# Patient Record
Sex: MALE | Race: BLACK OR AFRICAN AMERICAN | Employment: UNEMPLOYED | URBAN - METROPOLITAN AREA
[De-identification: names, ages, dates, MRNs, and addresses within clinical notes are randomized per-mention and may not be internally consistent; named-entity substitution may affect disease eponyms.]

---

## 2020-07-01 ENCOUNTER — APPOINTMENT (OUTPATIENT)
Dept: PHYSICAL THERAPY | Age: 45
End: 2020-07-01

## 2020-11-03 ENCOUNTER — HOSPITAL ENCOUNTER (OUTPATIENT)
Dept: NUTRITION | Age: 45
Discharge: HOME | End: 2020-11-03
Payer: COMMERCIAL

## 2020-11-03 PROCEDURE — 97802 MEDICAL NUTRITION INDIV IN: CPT

## 2020-11-03 NOTE — PROGRESS NOTES
..  [facility]  [address] [facility] [place] [facility]   Nutrition Assessment  Medical Nutrition Therapy   Outpatient Initial Evaluation         Patient Name: Traci Avina : 1975   Treatment Diagnosis: Type 2 DM   Referral Source: Vianney Wilson DPM [place] [facility]): 11/3/2020     Gender: male Age: 54 y.o. Ht: 72 in Wt: 420  lb  kg   BMI: 57.0 BMR   Male 5 BMR Female      Past Medical History:  NICHOLE, gout, hypertension, morbid obesity, history of PE, history of diabetic foot ulcer     Pertinent Medications:     Pitavastatin 2 mg, Allopurinol 100 mg, Norvasc 10 mg, Lisinopril 40 mg, Lasix 40 mg, The patient says he is taking 20 units of insulin at breakfast and dinner (he didn't know what kind)   Biochemical Data:   20 Glu 534, Glu  No recent A1C available. The patient says he thinks it was in the  recently and that's why he was put on insulin. Assessment:   The patient is here today for weight loss and diabetes education counseling. He says that he was diagnosed with diabetes a few years ago and received education about diabetes from a nurse at that time. Currently he is dealing with a diabetic foot ulcer that is not healing properly. He did not mention if he had been hospitalized. He say he just went back to the doctor and received a prescription for antibiotics; he still needs to  his prescription. He also reported that he started on insulin recently but he can't remember what kind. He takes 20 units before breakfast and 20 units before dinner. He says he does not check his blood sugars because he does not have a meter that matches the strips that he receives from his insurance company. He says that he's been trying to get this resolved for a few months. He works as a  but he's been out of work due to his foot ulcer. He says normally he is on his feet from 3-11, 5 days per week.  Lately, he has not been exercising. He also reports that he has a gym membership but he's not using it. He says he knows what to do to lose weight but he has trouble getting motivated to do it. Food & Nutrition: The patient says he only eats two, large meals every day. He has given up all sugary drinks but he still eats fast food sometimes. He says he  knows he eats \"too much. \"    Breakfast (8-9): turkey sausage or turkey saenz (3), 3 scrambled eggs, \"a bowl\" of cinnamon applesauce, or grits with butter, or oatmeal with butter and sugar  Snack: sometimes a turkey sandwich on white bread but usually skips  Dinner (6 PM): chicken, potatoes or rice (\"too much\"), loves vegetables- brussels sprouts, broccoli    Loves sweets. \"That's how I got to the size I am.\"  Beverages: seltzer water (4 cans per day), and water   Fast food- \"less now than I was\"    Problem areas: no portion control, eating two large meals per day, large gaps of time in between meals leads to overeating and overproducing sugar in the liver, fast food consumption, not a lot of starchy veggies in the diet, no exercise        Estimate Needs   Calories: 2800  Protein: 176 Carbs: 318 Fat: 94   Kcal/day  g/day  g/day  g/day        percent: 25  45  30               Nutrition Diagnosis   Morbid obesity related to excessive energy intake as evidenced by BMI of 57  Altered nutrition related lab values related to type 2 DM as evidenced by glucose of 534 and glucose POC of 339 on 8/16/20         Nutrition Intervention &  Education: Educated patient in diabetes, weight loss diet with plate method guidelines. Discussed the pathophysiology of diabetes, what is an A1C, the importance of checking blood sugars. Encouraged the patient to eat at least 3 times per day, spacing meals no more than 4-5 hours apart. Discussed that 1/2 the plate should include non-starchy vegetables, 1/4 plate should be lean protein, and 1/4 of plate should be carbohydrate.  Provided the patient with list of macro-nutrient sources (CHO, pro, and fat) and appropriate amounts of CHO to be consumed at each meal and snack. Encouraged the patient to try using measuring cups to familiarize with appropriate serving sizes. Suggested the patient include protein source with all meals and snacks. Include more non-starchy vegetables and 2 fruit servings per day (eat a variety). Don't drink calories: avoid fruit juice, regular sodas, sweet tea, Gatorade. Eliminate/decrease fast food consumption. Taught patient how to read a food label. We discussed the importance of timing of meals. Discussed importance of 150 minutes per week physical activity. Handouts Provided: [x]  Carbohydrates  [x]  Protein  [x]  Non-starchy Vegetables  [x]  Food Label  [x]  Meal and Snack Ideas  []  Food Journals [x]  Diabetes  []  Cholesterol  []  Sodium  [x]  Gen Nutr Guidelines  []  SBGM Guidelines  [x]  Others: My Healthy Plate   Information Reviewed with: Patient   Readiness to Change Stage: [x]  Pre-contemplative    []  Contemplative  []  Preparation               []  Action                  []  Maintenance   Potential Barriers to Learning: []  Decline in memory    []  Language barrier   []  Other:  []  Emotional                  []  Limited mobility  [x]  Lack of motivation     [] Vision, hearing or cognitive impairment   Expected Compliance:  Fair. The patient was willing to listen but I am not sure he is ready to make the lifestyle changes necessary to lose weight and have better blood sugar control. He says he knows what to do, it's just a matter of doing it. Nutritional Goal - To promote lifestyle changes to result in:    [x]  Weight loss  [x]  Improved diabetic control  []  Decreased cholesterol levels  []  Decreased blood pressure  []  Weight maintenance []  Preventing any interactions associated with food allergies  []  Adequate weight gain toward goal weight  []  Other:        Patient Goals:   1. Follow plate method for meals.  Space meals 4-5 hours apart. Eat smaller breakfast (measure portions of carbs). Eat lunch. Try to eat 3 smaller meals per day at regular times. 2. Add non-starchy vegetables to lunch and dinner meals. If need more food, eat more non-starchy vegetables and lean protein. Don't go back for seconds on carbs. 3. Eat more at home- less fast food. Don't super size. 4. Try walking after meals (10-15 minutes at a time as medically feasible). Goal of 30 minutes, 5 days per week. Be consistent with exercise.    William Gilmore RD, [facility]  Follow-up: The patient said he will call for an appointment if he needs more help Time: 10:35 AM

## 2020-11-30 ENCOUNTER — TELEPHONE (OUTPATIENT)
Dept: SURGERY | Age: 45
End: 2020-11-30

## 2020-11-30 NOTE — TELEPHONE ENCOUNTER
Spoke with patient indicating he has been referred to us by David Pathak N.P. for a thyroid nodule. The patient stated he would call us back, he was in process of attending a doctor's appointment.

## 2024-02-28 ENCOUNTER — OFFICE VISIT (OUTPATIENT)
Facility: CLINIC | Age: 49
End: 2024-02-28
Payer: COMMERCIAL

## 2024-02-28 VITALS
RESPIRATION RATE: 22 BRPM | TEMPERATURE: 97.3 F | HEIGHT: 72 IN | DIASTOLIC BLOOD PRESSURE: 80 MMHG | BODY MASS INDEX: 42.66 KG/M2 | WEIGHT: 315 LBS | OXYGEN SATURATION: 97 % | HEART RATE: 93 BPM | SYSTOLIC BLOOD PRESSURE: 136 MMHG

## 2024-02-28 DIAGNOSIS — E11.69 TYPE 2 DIABETES MELLITUS WITH OTHER SPECIFIED COMPLICATION, WITHOUT LONG-TERM CURRENT USE OF INSULIN (HCC): Primary | ICD-10-CM

## 2024-02-28 DIAGNOSIS — E66.01 MORBID OBESITY WITH BMI OF 60.0-69.9, ADULT (HCC): ICD-10-CM

## 2024-02-28 DIAGNOSIS — M25.511 RIGHT SHOULDER PAIN, UNSPECIFIED CHRONICITY: ICD-10-CM

## 2024-02-28 PROCEDURE — 99204 OFFICE O/P NEW MOD 45 MIN: CPT | Performed by: FAMILY MEDICINE

## 2024-02-28 RX ORDER — DAPAGLIFLOZIN 10 MG/1
10 TABLET, FILM COATED ORAL DAILY
COMMUNITY

## 2024-02-28 RX ORDER — AMLODIPINE BESYLATE 10 MG/1
10 TABLET ORAL DAILY
COMMUNITY
Start: 2021-03-06

## 2024-02-28 RX ORDER — LANCETS 28 GAUGE
EACH MISCELLANEOUS
COMMUNITY
Start: 2024-01-15

## 2024-02-28 RX ORDER — INSULIN DETEMIR 100 [IU]/ML
30 INJECTION, SOLUTION SUBCUTANEOUS NIGHTLY
Qty: 4 EACH | Refills: 5 | Status: SHIPPED | OUTPATIENT
Start: 2024-02-28

## 2024-02-28 RX ORDER — ALLOPURINOL 100 MG/1
100 TABLET ORAL DAILY
COMMUNITY

## 2024-02-28 RX ORDER — PEN NEEDLE, DIABETIC 31 GX5/16"
NEEDLE, DISPOSABLE MISCELLANEOUS
COMMUNITY
Start: 2024-01-21

## 2024-02-28 RX ORDER — LISINOPRIL 40 MG/1
1 TABLET ORAL DAILY
COMMUNITY
Start: 2020-02-04

## 2024-02-28 RX ORDER — WARFARIN SODIUM 5 MG/1
5 TABLET ORAL
COMMUNITY

## 2024-02-28 RX ORDER — ACETAMINOPHEN 325 MG/1
650 TABLET ORAL EVERY 6 HOURS
COMMUNITY
Start: 2021-04-01

## 2024-02-28 RX ORDER — BLOOD SUGAR DIAGNOSTIC
STRIP MISCELLANEOUS
COMMUNITY
Start: 2024-01-15

## 2024-02-28 RX ORDER — MELOXICAM 15 MG/1
15 TABLET ORAL DAILY
Qty: 10 TABLET | Refills: 3 | Status: SHIPPED | OUTPATIENT
Start: 2024-02-28 | End: 2024-04-08

## 2024-02-28 RX ORDER — PHENTERMINE HYDROCHLORIDE 37.5 MG/1
37.5 CAPSULE ORAL DAILY
COMMUNITY

## 2024-02-28 RX ORDER — INSULIN LISPRO 200 [IU]/ML
10 INJECTION, SOLUTION SUBCUTANEOUS
COMMUNITY

## 2024-02-28 RX ORDER — DULAGLUTIDE 0.75 MG/.5ML
0.75 INJECTION, SOLUTION SUBCUTANEOUS
COMMUNITY
End: 2024-02-28 | Stop reason: DRUGHIGH

## 2024-02-28 RX ORDER — INSULIN DETEMIR 100 [IU]/ML
INJECTION, SOLUTION SUBCUTANEOUS NIGHTLY
COMMUNITY
End: 2024-02-28 | Stop reason: SDUPTHER

## 2024-02-28 RX ORDER — BLOOD-GLUCOSE METER
EACH MISCELLANEOUS
COMMUNITY
Start: 2024-01-18

## 2024-02-28 RX ORDER — ATORVASTATIN CALCIUM 40 MG/1
40 TABLET, FILM COATED ORAL DAILY
COMMUNITY

## 2024-02-28 SDOH — ECONOMIC STABILITY: FOOD INSECURITY: WITHIN THE PAST 12 MONTHS, THE FOOD YOU BOUGHT JUST DIDN'T LAST AND YOU DIDN'T HAVE MONEY TO GET MORE.: NEVER TRUE

## 2024-02-28 SDOH — ECONOMIC STABILITY: HOUSING INSECURITY
IN THE LAST 12 MONTHS, WAS THERE A TIME WHEN YOU DID NOT HAVE A STEADY PLACE TO SLEEP OR SLEPT IN A SHELTER (INCLUDING NOW)?: NO

## 2024-02-28 SDOH — ECONOMIC STABILITY: FOOD INSECURITY: WITHIN THE PAST 12 MONTHS, YOU WORRIED THAT YOUR FOOD WOULD RUN OUT BEFORE YOU GOT MONEY TO BUY MORE.: NEVER TRUE

## 2024-02-28 SDOH — ECONOMIC STABILITY: INCOME INSECURITY: HOW HARD IS IT FOR YOU TO PAY FOR THE VERY BASICS LIKE FOOD, HOUSING, MEDICAL CARE, AND HEATING?: NOT HARD AT ALL

## 2024-02-28 ASSESSMENT — PATIENT HEALTH QUESTIONNAIRE - PHQ9
2. FEELING DOWN, DEPRESSED OR HOPELESS: 0
SUM OF ALL RESPONSES TO PHQ QUESTIONS 1-9: 0
1. LITTLE INTEREST OR PLEASURE IN DOING THINGS: 0
SUM OF ALL RESPONSES TO PHQ QUESTIONS 1-9: 0
SUM OF ALL RESPONSES TO PHQ9 QUESTIONS 1 & 2: 0

## 2024-02-28 ASSESSMENT — ENCOUNTER SYMPTOMS
BACK PAIN: 1
ABDOMINAL PAIN: 0
CHEST TIGHTNESS: 0

## 2024-02-28 NOTE — PROGRESS NOTES
\"Have you been to the ER, urgent care clinic since your last visit?  Hospitalized since your last visit?\"    NO    “Have you seen or consulted any other health care providers outside of [facility] since your last visit?”    NO    “Have you had a colorectal cancer screening such as a colonoscopy/FIT/Cologuard?    NO    Temo MAKEDA Zhao presents today for   Chief Complaint   Patient presents with    New Patient    Establish Care       Is someone accompanying this pt? No    Is the patient using any DME equipment during OV? No    Depression Screenin/28/2024     2:02 PM   PHQ-9 Questionaire   Little interest or pleasure in doing things 0   Feeling down, depressed, or hopeless 0   PHQ-9 Total Score 0         2024     2:02 PM   PHQ Scores   PHQ2 Score 0   PHQ9 Score 0       Learning Assessment:  No question data found.    Abuse Screening:       No data to display                Fall Risk       No data to display                OPIOID RISK TOOL       No data to display                       
appearance. He is obese.   HENT:      Head: Normocephalic and atraumatic.   Cardiovascular:      Rate and Rhythm: Normal rate and regular rhythm.   Pulmonary:      Effort: Pulmonary effort is normal.      Breath sounds: Normal breath sounds.   Abdominal:      General: Abdomen is flat.      Palpations: Abdomen is soft.   Musculoskeletal:      Right shoulder: Tenderness and crepitus present. No swelling, deformity or effusion. Decreased range of motion. Normal pulse.      Left shoulder: Tenderness present. No swelling, deformity or effusion. Decreased range of motion. Normal pulse.      Right upper arm: Normal. No edema.      Left upper arm: Normal. No edema.        Arms:       Comments: Area of pain and tenderness. No swelling or erythema.   Able to abduct to 90 degrees with pain. Having difficult time trying to touch back of neck.   Skin:     General: Skin is warm and dry.   Neurological:      Mental Status: He is alert.   Psychiatric:         Judgment: Judgment normal.                  An electronic signature was used to authenticate this note.    --Catalina Beaulieu MD

## 2024-02-29 LAB
A/G RATIO: 0.9 RATIO (ref 1.1–2.6)
ALBUMIN SERPL-MCNC: 3.6 G/DL (ref 3.5–5)
ALP BLD-CCNC: 109 U/L (ref 25–115)
ALT SERPL-CCNC: 22 U/L (ref 5–40)
ANION GAP SERPL CALCULATED.3IONS-SCNC: 10 MMOL/L (ref 3–15)
AST SERPL-CCNC: 15 U/L (ref 10–37)
BILIRUB SERPL-MCNC: 0.2 MG/DL (ref 0.2–1.2)
BUN BLDV-MCNC: 17 MG/DL (ref 6–22)
CALCIUM SERPL-MCNC: 9.1 MG/DL (ref 8.4–10.5)
CHLORIDE BLD-SCNC: 104 MMOL/L (ref 98–110)
CHOLESTEROL/HDL RATIO: 4.4 (ref 0–5)
CHOLESTEROL: 166 MG/DL (ref 110–200)
CO2: 24 MMOL/L (ref 20–32)
CREAT SERPL-MCNC: 1.8 MG/DL (ref 0.5–1.2)
ESTIMATED AVERAGE GLUCOSE: 215 MG/DL (ref 91–123)
GLOBULIN: 4 G/DL (ref 2–4)
GLOMERULAR FILTRATION RATE: 44.9 ML/MIN/1.73 SQ.M.
GLUCOSE: 286 MG/DL (ref 70–99)
HBA1C MFR BLD: 9.1 % (ref 4.8–5.6)
HDLC SERPL-MCNC: 38 MG/DL
LDL CHOLESTEROL CALCULATED: 80 MG/DL (ref 50–99)
LDL/HDL RATIO: 2.1
NON-HDL CHOLESTEROL: 128 MG/DL
POTASSIUM SERPL-SCNC: 4.7 MMOL/L (ref 3.5–5.5)
SODIUM BLD-SCNC: 138 MMOL/L (ref 133–145)
T4 FREE: 1 NG/DL (ref 0.9–1.8)
TOTAL PROTEIN: 7.6 G/DL (ref 6.4–8.3)
TRIGL SERPL-MCNC: 237 MG/DL (ref 40–149)
TSH SERPL DL<=0.05 MIU/L-ACNC: 2.35 MCU/ML (ref 0.27–4.2)
VLDLC SERPL CALC-MCNC: 47 MG/DL (ref 8–30)

## 2024-03-20 ENCOUNTER — TELEPHONE (OUTPATIENT)
Facility: CLINIC | Age: 49
End: 2024-03-20

## 2024-03-20 NOTE — TELEPHONE ENCOUNTER
Staff informs of recently anticoagulation services note form [facility] stating they informed the patient that we do not monitor [facility] patients, INR was 4.10 as of 3/15/24.    Call placed to [facility] to clarify the patient's status at the clinic. Staff states the pt will receive service for 30 days from receiving the non participating notice. The pt's final appointment is scheduled for 3/25/24. Staff states due to his insurance, Medicaid based, the INR at home is not covered.     PCP notified.

## 2024-03-21 DIAGNOSIS — E11.69 TYPE 2 DIABETES MELLITUS WITH OTHER SPECIFIED COMPLICATION, WITHOUT LONG-TERM CURRENT USE OF INSULIN (HCC): Primary | ICD-10-CM

## 2024-03-21 RX ORDER — DAPAGLIFLOZIN 10 MG/1
10 TABLET, FILM COATED ORAL DAILY
Qty: 90 TABLET | Refills: 1 | Status: SHIPPED | OUTPATIENT
Start: 2024-03-21

## 2024-03-21 RX ORDER — INSULIN GLARGINE 100 [IU]/ML
INJECTION, SOLUTION SUBCUTANEOUS
Qty: 5 ADJUSTABLE DOSE PRE-FILLED PEN SYRINGE | Refills: 5 | Status: SHIPPED | OUTPATIENT
Start: 2024-03-21

## 2024-03-21 NOTE — TELEPHONE ENCOUNTER
Last Appointment:  2/28/2024  Future Appointments   Date Time Provider Department Center   5/28/2024  2:30 PM Catalina Beaulieu MD [facility]

## 2024-03-25 NOTE — TELEPHONE ENCOUNTER
Received a call from [facility] to notify the pt's INR today is 1.9. Pt is instructed to be rechecked in 2 weeks. This is the pt's last appointment today. PCP notified.

## 2024-03-26 DIAGNOSIS — M1A.9XX0 CHRONIC GOUT WITHOUT TOPHUS, UNSPECIFIED CAUSE, UNSPECIFIED SITE: ICD-10-CM

## 2024-03-26 DIAGNOSIS — I10 ESSENTIAL HYPERTENSION: ICD-10-CM

## 2024-03-26 DIAGNOSIS — E11.69 TYPE 2 DIABETES MELLITUS WITH OTHER SPECIFIED COMPLICATION, WITHOUT LONG-TERM CURRENT USE OF INSULIN (HCC): Primary | ICD-10-CM

## 2024-03-26 RX ORDER — AMLODIPINE BESYLATE 10 MG/1
10 TABLET ORAL DAILY
Qty: 90 TABLET | Refills: 1 | Status: SHIPPED | OUTPATIENT
Start: 2024-03-26

## 2024-03-26 RX ORDER — BLOOD SUGAR DIAGNOSTIC
STRIP MISCELLANEOUS
Qty: 100 EACH | Refills: 5 | Status: SHIPPED | OUTPATIENT
Start: 2024-03-26

## 2024-03-26 RX ORDER — ALLOPURINOL 100 MG/1
100 TABLET ORAL DAILY
Qty: 90 TABLET | Refills: 1 | Status: SHIPPED | OUTPATIENT
Start: 2024-03-26

## 2024-04-10 ENCOUNTER — OFFICE VISIT (OUTPATIENT)
Facility: CLINIC | Age: 49
End: 2024-04-10
Payer: COMMERCIAL

## 2024-04-10 VITALS
TEMPERATURE: 97.3 F | BODY MASS INDEX: 42.66 KG/M2 | DIASTOLIC BLOOD PRESSURE: 82 MMHG | SYSTOLIC BLOOD PRESSURE: 134 MMHG | OXYGEN SATURATION: 96 % | RESPIRATION RATE: 20 BRPM | HEART RATE: 90 BPM | WEIGHT: 315 LBS | HEIGHT: 72 IN

## 2024-04-10 DIAGNOSIS — Z79.01 CURRENT USE OF LONG TERM ANTICOAGULATION: ICD-10-CM

## 2024-04-10 DIAGNOSIS — E66.01 MORBID OBESITY WITH BMI OF 60.0-69.9, ADULT (HCC): ICD-10-CM

## 2024-04-10 DIAGNOSIS — Z51.81 ENCOUNTER FOR THERAPEUTIC DRUG LEVEL MONITORING: ICD-10-CM

## 2024-04-10 DIAGNOSIS — I26.99 PULMONARY EMBOLISM AND INFARCTION (HCC): Primary | ICD-10-CM

## 2024-04-10 LAB
POC INR: 1.7
PROTHROMBIN TIME, POC: 20.3

## 2024-04-10 PROCEDURE — 85610 PROTHROMBIN TIME: CPT | Performed by: FAMILY MEDICINE

## 2024-04-10 PROCEDURE — 99213 OFFICE O/P EST LOW 20 MIN: CPT | Performed by: FAMILY MEDICINE

## 2024-04-10 RX ORDER — GLIMEPIRIDE 4 MG/1
4 TABLET ORAL
COMMUNITY
Start: 2024-03-22

## 2024-04-10 RX ORDER — FLUOROMETHOLONE 0.1 %
1 SUSPENSION, DROPS(FINAL DOSAGE FORM)(ML) OPHTHALMIC (EYE) 2 TIMES DAILY
COMMUNITY
Start: 2024-04-09

## 2024-04-10 ASSESSMENT — PATIENT HEALTH QUESTIONNAIRE - PHQ9
SUM OF ALL RESPONSES TO PHQ QUESTIONS 1-9: 0
SUM OF ALL RESPONSES TO PHQ QUESTIONS 1-9: 0
SUM OF ALL RESPONSES TO PHQ9 QUESTIONS 1 & 2: 0
SUM OF ALL RESPONSES TO PHQ QUESTIONS 1-9: 0
1. LITTLE INTEREST OR PLEASURE IN DOING THINGS: NOT AT ALL
2. FEELING DOWN, DEPRESSED OR HOPELESS: NOT AT ALL
SUM OF ALL RESPONSES TO PHQ QUESTIONS 1-9: 0

## 2024-04-10 NOTE — PROGRESS NOTES
Temo Churchill (:  1975) is a 48 y.o. male,Established patient, here for evaluation of the following chief complaint(s):  Office Visit for Anticoagulation Management         ASSESSMENT/PLAN:  1. Pulmonary embolism and infarction (HCC)  -     AMB POC PT/INR  2. Current use of long term anticoagulation  3. Encounter for therapeutic drug level monitoring  4. Morbid obesity with BMI of 60.0-69.9, adult (HCC)      Current use of long term anticoagulation-  -INR was 1.7 today decreased from 1.9.     -Patient instructed to take 10 mg of coumadin each night except take 5mg on Tuesday and Thursday.     -RTC in 10 days to recheck INR.        Morbid obesity-   Encouraged to decrease caloric intake.  Need to exercise for 30 minutes 3 to 5 times a week.  Educated on eating a well balanced healthy diet. (Consistent of lean meats, lots of fruits, vegetables and whole grains).  Limit processed foods.     Return in about 10 days (around 2024) for PE/DVT , on coumadin ( check PT/INR).       Subjective   SUBJECTIVE/OBJECTIVE:  48-year-old male with a history of pulmonary embolus unspecified whether acute cor pulmonale present.  Patient had been followed by [facility]. However, his last appointment with [facility] was on 2024.  His INR at that time was 1.9.  Patient continued to take his warfarin 60 mg per weekly as prescribed.  He was advised to continue his current dose and repeat his INR in 2 weeks per protocol.  Patient admits to adhering to his Coumadin regimen.  He denies having any easy bruising, blood in urine, nose bleed or bleeding gums.   No CP or SOB.    Tolerating medication without side effects.   He does state that he did eat some greens.    Current regimen was 10 mg coumadin each night except  for on  and .   His INR today was checked.  His current INR goal is to be between 2.0-3.0          Date:                     INR  (4/10/24)-------------

## 2024-04-10 NOTE — PROGRESS NOTES
\"Have you been to the ER, urgent care clinic since your last visit?  Hospitalized since your last visit?\"    NO    “Have you seen or consulted any other health care providers outside of [facility] since your last visit?”    NO        “Have you had a colorectal cancer screening such as a colonoscopy/FIT/Cologuard?    NO    No colonoscopy on file  No cologuard on file  No FIT/FOBT on file   No flexible sigmoidoscopy on file     [place] presents today for   Chief Complaint   Patient presents with    Office Visit for Anticoagulation Management       Is someone accompanying this pt? No     Is the patient using any DME equipment during OV? No    Depression Screenin/10/2024    11:47 AM 2024     2:02 PM   PHQ-9 Questionaire   Little interest or pleasure in doing things 0 0   Feeling down, depressed, or hopeless 0 0   PHQ-9 Total Score 0 0         4/10/2024    11:47 AM 2024     2:02 PM   PHQ Scores   PHQ2 Score 0 0   PHQ9 Score 0 0       Learning Assessment:  No question data found.    Abuse Screening:       No data to display                Fall Risk       No data to display                OPIOID RISK TOOL       No data to display

## 2024-04-11 NOTE — TELEPHONE ENCOUNTER
Patient no showed for his appointment last week, however patient was seen today in follow-up.  His INR was 1.7 which was decreased from his previous 1.9.  Patient was told to take warfarin 10 mg every night of the week except for on Tuesdays and Thursdays he is to take 5 mg of warfarin.  He will return in 10 days for his PT and INR to be rechecked.

## 2024-04-12 ASSESSMENT — ENCOUNTER SYMPTOMS
SHORTNESS OF BREATH: 0
ROS SKIN COMMENTS: NO BRUISING

## 2024-04-30 ENCOUNTER — OFFICE VISIT (OUTPATIENT)
Facility: CLINIC | Age: 49
End: 2024-04-30
Payer: COMMERCIAL

## 2024-04-30 VITALS
OXYGEN SATURATION: 97 % | WEIGHT: 315 LBS | DIASTOLIC BLOOD PRESSURE: 57 MMHG | SYSTOLIC BLOOD PRESSURE: 110 MMHG | TEMPERATURE: 97.5 F | HEIGHT: 72 IN | BODY MASS INDEX: 42.66 KG/M2 | RESPIRATION RATE: 11 BRPM | HEART RATE: 96 BPM

## 2024-04-30 DIAGNOSIS — R20.2 NUMBNESS AND TINGLING IN BOTH HANDS: ICD-10-CM

## 2024-04-30 DIAGNOSIS — E11.69 TYPE 2 DIABETES MELLITUS WITH OTHER SPECIFIED COMPLICATION, WITHOUT LONG-TERM CURRENT USE OF INSULIN (HCC): ICD-10-CM

## 2024-04-30 DIAGNOSIS — I10 ESSENTIAL HYPERTENSION: ICD-10-CM

## 2024-04-30 DIAGNOSIS — R20.0 NUMBNESS AND TINGLING IN BOTH HANDS: ICD-10-CM

## 2024-04-30 DIAGNOSIS — E66.01 MORBID OBESITY WITH BMI OF 60.0-69.9, ADULT (HCC): ICD-10-CM

## 2024-04-30 DIAGNOSIS — I26.99 PULMONARY EMBOLISM AND INFARCTION (HCC): ICD-10-CM

## 2024-04-30 DIAGNOSIS — Z79.01 CURRENT USE OF LONG TERM ANTICOAGULATION: Primary | ICD-10-CM

## 2024-04-30 DIAGNOSIS — Z51.81 ENCOUNTER FOR THERAPEUTIC DRUG LEVEL MONITORING: ICD-10-CM

## 2024-04-30 LAB
POC INR: 2.4
PROTHROMBIN TIME, POC: 28.5

## 2024-04-30 PROCEDURE — 85610 PROTHROMBIN TIME: CPT | Performed by: FAMILY MEDICINE

## 2024-04-30 PROCEDURE — 3046F HEMOGLOBIN A1C LEVEL >9.0%: CPT | Performed by: FAMILY MEDICINE

## 2024-04-30 PROCEDURE — 3078F DIAST BP <80 MM HG: CPT | Performed by: FAMILY MEDICINE

## 2024-04-30 PROCEDURE — 99213 OFFICE O/P EST LOW 20 MIN: CPT | Performed by: FAMILY MEDICINE

## 2024-04-30 PROCEDURE — 3074F SYST BP LT 130 MM HG: CPT | Performed by: FAMILY MEDICINE

## 2024-04-30 RX ORDER — AMLODIPINE BESYLATE 10 MG/1
10 TABLET ORAL DAILY
Qty: 90 TABLET | Refills: 1 | Status: SHIPPED | OUTPATIENT
Start: 2024-04-30

## 2024-04-30 ASSESSMENT — PATIENT HEALTH QUESTIONNAIRE - PHQ9
SUM OF ALL RESPONSES TO PHQ QUESTIONS 1-9: 0
SUM OF ALL RESPONSES TO PHQ QUESTIONS 1-9: 0
1. LITTLE INTEREST OR PLEASURE IN DOING THINGS: NOT AT ALL
SUM OF ALL RESPONSES TO PHQ9 QUESTIONS 1 & 2: 0
SUM OF ALL RESPONSES TO PHQ QUESTIONS 1-9: 0
2. FEELING DOWN, DEPRESSED OR HOPELESS: NOT AT ALL
SUM OF ALL RESPONSES TO PHQ QUESTIONS 1-9: 0

## 2024-04-30 NOTE — PROGRESS NOTES
\"Have you been to the ER, urgent care clinic since your last visit?  Hospitalized since your last visit?\"    NO    “Have you seen or consulted any other health care providers outside of [facility] since your last visit?”    NO        “Have you had a colorectal cancer screening such as a colonoscopy/FIT/Cologuard?    NO    No colonoscopy on file  No cologuard on file  No FIT/FOBT on file   No flexible sigmoidoscopy on file         Click Here for Release of Records Request

## 2024-04-30 NOTE — PROGRESS NOTES
Temo Churchill (:  1975) is a 48 y.o. male,Established patient, here for evaluation of the following chief complaint(s):  Follow-up      Assessment & Plan   1. Current use of long term anticoagulation  2. Essential hypertension  -     amLODIPine (NORVASC) 10 MG tablet; Take 1 tablet by mouth daily, Disp-90 tablet, R-1Normal  3. Encounter for therapeutic drug level monitoring  -     AMB POC PT/INR  4. Pulmonary embolism and infarction (HCC)  5. Morbid obesity with BMI of 60.0-69.9, adult (HCC)  6. Type 2 diabetes mellitus with other specified complication, without long-term current use of insulin (HCC)  7. Numbness and tingling in both hands      Current use of long term anticoagulation-  -INR was 2.4 today increased from 1.9.     -Patient instructed to continue to take 10 mg of coumadin each night except take 5mg on Tuesday and Thursday.     -RTC in 2 weeks to recheck INR.         Numbness/Tingling in both fingers.---  Offered patient treatment with either gabapentin or Lyrica.  However patient declined.      Morbid obesity-   Encouraged to decrease caloric intake.  Need to exercise for 30 minutes 3 to 5 times a week.  Educated on eating a well balanced healthy diet. (Consistent of lean meats, lots of fruits, vegetables and whole grains).  Limit processed foods.       Return in about 2 years (around 2026) for OV extended, PE, morbid obesity----  check PT/INR.       Subjective    Patient presents for follow up on his PT/INR .    He is taking coumadin 10 each day except on  taking 5 mg  day. He denies having any easy bruising, blood in urine, nose bleed or bleeding gums.   No CP or SOB.    Tolerating medication without side effects.   He does state that he did eat some greens.     Current regimen was 10 mg coumadin each night except  for on  and .   His INR today was checked.  His current INR goal is to be between 2.0-3.0            Date:

## 2024-05-01 ASSESSMENT — ENCOUNTER SYMPTOMS: SHORTNESS OF BREATH: 0

## 2024-05-13 ENCOUNTER — TELEPHONE (OUTPATIENT)
Facility: CLINIC | Age: 49
End: 2024-05-13

## 2024-05-13 DIAGNOSIS — E11.69 TYPE 2 DIABETES MELLITUS WITH OTHER SPECIFIED COMPLICATION, WITHOUT LONG-TERM CURRENT USE OF INSULIN (HCC): Primary | ICD-10-CM

## 2024-05-13 LAB
ESTIMATED AVERAGE GLUCOSE: 211
HBA1C MFR BLD: 9 %

## 2024-05-13 RX ORDER — GLIMEPIRIDE 4 MG/1
4 TABLET ORAL 2 TIMES DAILY
Qty: 180 TABLET | Refills: 1 | Status: SHIPPED | OUTPATIENT
Start: 2024-05-13

## 2024-05-13 NOTE — TELEPHONE ENCOUNTER
Patient is calling in stating he was just released from the hospital and had an INR done yesterday while at the hospital.  Wants to know if he still needs to come in tomorrow?  Can be reached at [phone]

## 2024-05-14 NOTE — TELEPHONE ENCOUNTER
Spoke with patient.  His INR was 2.06 which is within the therapeutic range.  Patient was informed that he can schedule his appointment for 2 weeks for repeat PT INR.  He is to continue taking warfarin 5 mg 2 tablets each night except on Tuesdays and Thursday nights he takes 1 tablet of warfarin 5 mg.    If INR is appropriate at the next 2-week office visit.  Will then check patient's PT/INR once monthly.    Orders Placed This Encounter    glimepiride (AMARYL) 4 MG tablet     Sig: Take 1 tablet by mouth 2 times daily     Dispense:  180 tablet     Refill:  1     The above medication was sent to patient's pharmacy per patient's request.

## 2024-05-28 ENCOUNTER — OFFICE VISIT (OUTPATIENT)
Facility: CLINIC | Age: 49
End: 2024-05-28
Payer: COMMERCIAL

## 2024-05-28 VITALS
OXYGEN SATURATION: 99 % | RESPIRATION RATE: 14 BRPM | DIASTOLIC BLOOD PRESSURE: 80 MMHG | WEIGHT: 315 LBS | BODY MASS INDEX: 42.66 KG/M2 | HEIGHT: 72 IN | HEART RATE: 90 BPM | SYSTOLIC BLOOD PRESSURE: 144 MMHG | TEMPERATURE: 97 F

## 2024-05-28 DIAGNOSIS — I10 ESSENTIAL HYPERTENSION: ICD-10-CM

## 2024-05-28 DIAGNOSIS — E11.69 TYPE 2 DIABETES MELLITUS WITH OTHER SPECIFIED COMPLICATION, WITHOUT LONG-TERM CURRENT USE OF INSULIN (HCC): ICD-10-CM

## 2024-05-28 DIAGNOSIS — I26.99 PULMONARY EMBOLISM AND INFARCTION (HCC): ICD-10-CM

## 2024-05-28 DIAGNOSIS — Z79.01 CURRENT USE OF LONG TERM ANTICOAGULATION: Primary | ICD-10-CM

## 2024-05-28 DIAGNOSIS — R20.0 NUMBNESS AND TINGLING IN BOTH HANDS: ICD-10-CM

## 2024-05-28 DIAGNOSIS — Z51.81 ENCOUNTER FOR THERAPEUTIC DRUG LEVEL MONITORING: ICD-10-CM

## 2024-05-28 DIAGNOSIS — R20.2 NUMBNESS AND TINGLING IN BOTH HANDS: ICD-10-CM

## 2024-05-28 DIAGNOSIS — E78.5 HYPERLIPIDEMIA, UNSPECIFIED HYPERLIPIDEMIA TYPE: ICD-10-CM

## 2024-05-28 DIAGNOSIS — E66.01 MORBID OBESITY WITH BMI OF 60.0-69.9, ADULT (HCC): ICD-10-CM

## 2024-05-28 LAB
POC INR: 1.3
PROTHROMBIN TIME, POC: 15

## 2024-05-28 PROCEDURE — 3077F SYST BP >= 140 MM HG: CPT | Performed by: FAMILY MEDICINE

## 2024-05-28 PROCEDURE — 99214 OFFICE O/P EST MOD 30 MIN: CPT | Performed by: FAMILY MEDICINE

## 2024-05-28 PROCEDURE — 3079F DIAST BP 80-89 MM HG: CPT | Performed by: FAMILY MEDICINE

## 2024-05-28 PROCEDURE — 85610 PROTHROMBIN TIME: CPT | Performed by: FAMILY MEDICINE

## 2024-05-28 PROCEDURE — 3046F HEMOGLOBIN A1C LEVEL >9.0%: CPT | Performed by: FAMILY MEDICINE

## 2024-05-28 ASSESSMENT — PATIENT HEALTH QUESTIONNAIRE - PHQ9
SUM OF ALL RESPONSES TO PHQ QUESTIONS 1-9: 0
SUM OF ALL RESPONSES TO PHQ9 QUESTIONS 1 & 2: 0
SUM OF ALL RESPONSES TO PHQ QUESTIONS 1-9: 0
2. FEELING DOWN, DEPRESSED OR HOPELESS: NOT AT ALL
SUM OF ALL RESPONSES TO PHQ QUESTIONS 1-9: 0
1. LITTLE INTEREST OR PLEASURE IN DOING THINGS: NOT AT ALL
SUM OF ALL RESPONSES TO PHQ QUESTIONS 1-9: 0

## 2024-05-28 ASSESSMENT — ENCOUNTER SYMPTOMS: SHORTNESS OF BREATH: 0

## 2024-05-28 NOTE — PROGRESS NOTES
\"Have you been to the ER, urgent care clinic since your last visit?  Hospitalized since your last visit?\"    YES - When: approximately 2  weeks ago.  Where and Why: [facility].    “Have you seen or consulted any other health care providers outside of [facility] since your last visit?”    NO        “Have you had a colorectal cancer screening such as a colonoscopy/FIT/Cologuard?    NO    No colonoscopy on file  No cologuard on file  No FIT/FOBT on file   No flexible sigmoidoscopy on file         Click Here for Release of Records Request   
USE 1 TO CHECK GLUCOSE ONCE DAILY    lisinopril (PRINIVIL;ZESTRIL) 40 MG tablet Take 1 tablet by mouth daily    phentermine 37.5 MG capsule Take 1 capsule by mouth daily.    warfarin (COUMADIN) 5 MG tablet Take 1 tablet by mouth    atorvastatin (LIPITOR) 40 MG tablet Take 1 tablet by mouth daily    insulin lispro (HUMALOG KWIKPEN) 200 UNIT/ML SOPN pen Inject 10 Units into the skin 3 times daily (with meals)    meloxicam (MOBIC) 15 MG tablet Take 1 tablet by mouth daily     No current facility-administered medications for this visit.         Allergies and Intolerances:   Allergies   Allergen Reactions    Povidone-Iodine Other (See Comments)     Patient states he is allergic to iodine but not iodinated contrast. Has had contrast multiple times in the past without reaction.    Shellfish Allergy Itching and Swelling       Family History:   No family history on file.    Social History:   He  reports that he has never smoked. He has never been exposed to tobacco smoke. He has never used smokeless tobacco.  He  reports no history of alcohol use.    Review of Systems   Constitutional:  Negative for chills and fever.   HENT:  Negative for nosebleeds.    Respiratory:  Negative for shortness of breath.    Cardiovascular:  Negative for chest pain and leg swelling.   Genitourinary:  Negative for hematuria.   Neurological:  Negative for weakness and headaches.   Hematological:  Does not bruise/bleed easily.   Psychiatric/Behavioral:  Negative for confusion.                 Objective  BP (!) 144/80 (Site: Right Upper Arm, Position: Sitting, Cuff Size: Thigh)   Pulse 90   Temp 97 °F (36.1 °C) (Temporal)   Resp 14   Ht 1.829 m (6')   Wt (!) 208.9 kg (460 lb 9.6 oz)   SpO2 99%   BMI 62.47 kg/m²     Physical Exam  Vitals and nursing note reviewed.   Constitutional:       Appearance: Normal appearance.   HENT:      Head: Normocephalic and atraumatic.      Mouth/Throat:      Mouth: Mucous membranes are moist.   Eyes:

## 2024-05-29 ENCOUNTER — TELEPHONE (OUTPATIENT)
Facility: CLINIC | Age: 49
End: 2024-05-29

## 2024-05-29 DIAGNOSIS — E11.69 TYPE 2 DIABETES MELLITUS WITH OTHER SPECIFIED COMPLICATION, WITHOUT LONG-TERM CURRENT USE OF INSULIN (HCC): Primary | ICD-10-CM

## 2024-05-29 NOTE — TELEPHONE ENCOUNTER
Pt said that he was instructed to call  [facility] that  can send in Ozempic.     Pt received text today from pharmacy stating that Trulicity is on back order there.

## 2024-05-30 ENCOUNTER — TELEPHONE (OUTPATIENT)
Facility: CLINIC | Age: 49
End: 2024-05-30

## 2024-05-31 NOTE — TELEPHONE ENCOUNTER
Orders Placed This Encounter    Semaglutide, 1 MG/DOSE, (OZEMPIC) 4 MG/3ML SOPN sc injection     Sig: Inject 1 mg into the skin every 7 days     Dispense:  3 mL     Refill:  3

## 2024-07-18 ENCOUNTER — TELEPHONE (OUTPATIENT)
Facility: CLINIC | Age: 49
End: 2024-07-18

## 2024-07-18 NOTE — TELEPHONE ENCOUNTER
Katina with [facility] is calling asking  wants them to be checking the patients INR since his recent dicharge from the hospital or is that something  would want to do in office.    Discharge ppwrk stated for the patient to check his INR 7/17/24.     Please reach out.

## 2024-07-19 NOTE — TELEPHONE ENCOUNTER
Attempted to reach [facility]. Left voicemail to return my call.

## 2024-07-19 NOTE — TELEPHONE ENCOUNTER
Inform Katina with [facility] that they can check the patient's PT/INR.  Call the results to office for MD to review and make any necessary adjustment in his warfarin.    Thanks.

## 2024-07-29 ENCOUNTER — TELEPHONE (OUTPATIENT)
Facility: CLINIC | Age: 49
End: 2024-07-29

## 2024-07-29 NOTE — TELEPHONE ENCOUNTER
Nuzhat called on behalf of the pt stating that she was calling to give the PT INR for the pt from 7/25/24. PT INR was 1.42

## 2024-08-01 NOTE — TELEPHONE ENCOUNTER
I have attempted to contact [facility] by phone regarding mutual patient to relay Dr. Beaulieu's Message   Please contact Nuzhat (home health nurse) and inform her that patient needs to take 10 mg of Coumadin  today ( Thursday 8/1/24  and on Friday 8/2/24) then take 5 mg on Saturday and Sunday.   Recheck PT/INR on Monday (8/5/2024).  Call MD with reading to receive next dosing instructions for his coumadin.  Thanks.     left message to return my call on answering machine.

## 2024-08-01 NOTE — TELEPHONE ENCOUNTER
Please contact Nuzhat (home health nurse) and inform her that patient needs to take 10 mg of Coumadin  today ( Thursday 8/1/24  and on Friday 8/2/24) then take 5 mg on Saturday and Sunday.   Recheck PT/INR on Monday (8/5/2024).  Call MD with reading to receive next dosing instructions for his coumadin.  Thanks.

## 2024-08-04 LAB
ESTIMATED AVERAGE GLUCOSE: 206
HBA1C MFR BLD: 8.8 %

## 2024-08-07 ENCOUNTER — TELEPHONE (OUTPATIENT)
Facility: CLINIC | Age: 49
End: 2024-08-07

## 2024-08-07 NOTE — TELEPHONE ENCOUNTER
Paula with [facility] is calling to inform you that she needs INR orders faxed to their office at [phone] and to also inform you that the patient may not be able to attend his appointment on 8/12/24 due to transportation issues

## 2024-08-09 NOTE — TELEPHONE ENCOUNTER
[facility],   Inform her that orders were called in by you regarding INR orders.     Still awaiting INR results.   Thanks.

## 2024-08-09 NOTE — TELEPHONE ENCOUNTER
Spoke with Nuzhat today following up message I left on [employer] 8 days ago. She stated \" Mr. Churchill isn't her main patient. Katina is, I advice the message Dr. Beaulieu left again to recheck Patient's PT/INR, being that the message was received so late. The dates are changed around to have patient take Coumadin 10 mg, (8/9/2024 & 8/10/2024) then on 8/11/2024 *& 8/12/2024 take 5 mg of Coumadin. Them recheck PT/INR on Tuesday 08/13/2024.

## 2024-08-12 DIAGNOSIS — I10 ESSENTIAL HYPERTENSION: ICD-10-CM

## 2024-08-12 RX ORDER — AMLODIPINE BESYLATE 10 MG/1
10 TABLET ORAL DAILY
Qty: 90 TABLET | Refills: 0 | Status: SHIPPED | OUTPATIENT
Start: 2024-08-12

## 2024-08-12 RX ORDER — LISINOPRIL 10 MG/1
10 TABLET ORAL DAILY
Qty: 90 TABLET | Refills: 1 | Status: SHIPPED | OUTPATIENT
Start: 2024-08-12

## 2024-08-13 ENCOUNTER — TELEPHONE (OUTPATIENT)
Facility: CLINIC | Age: 49
End: 2024-08-13

## 2024-08-13 NOTE — TELEPHONE ENCOUNTER
Katina with [facility] is calling to let you know she was unable to get the lab today for the patient's INR.  States she will be going back tomorrow with one of the fingers stick machines.

## 2024-08-14 ENCOUNTER — TELEPHONE (OUTPATIENT)
Facility: CLINIC | Age: 49
End: 2024-08-14

## 2024-08-14 NOTE — TELEPHONE ENCOUNTER
Call transferred from  regarding patient Temo Churchill from [facility], with 2 patient identifiers.  Paz relayed patient's INR today was 2.6 on 10mg Coumadin nightly.  In addition, she said patient told her he was instructed for Monday and Tuesday to take on 5 mg Coumadin only.    Patient has a h/o PE and has canx his last 3 appt with PCP-Dr. Beaulieu.  Paz states he is having transportation issues and is unable to make doctor's appts.  He has osteomyelitis of the foot and is non-weight bearing.  She will like a return call to [phone] regarding his INR results and Coumadin dosage.

## 2024-08-16 NOTE — TELEPHONE ENCOUNTER
Patient Home health nurse called 8/14/24. She stated patient INR was 2.6 He is taking 10 mg nightly and was told on Monday and Tuesday to only take 5 mg. I believe that order was the verbal you gave 8/9/24.

## 2024-08-19 ENCOUNTER — TELEPHONE (OUTPATIENT)
Facility: CLINIC | Age: 49
End: 2024-08-19

## 2024-08-19 NOTE — TELEPHONE ENCOUNTER
Received a call from [facility]. Staff Nurse states the pt is on Coumadin and inquires if the pt needs to be monitored for INR.    Staff states the last time PT/INR was checked was 08/09/24 and the order was to repeat INR on 08/13/24. IV Team nurse will draw for the INR today. Staff is requesting an verbal order to Draw PT/INR once weekly. Last known dose was Coumadin 10mg on 08/9 and 8/10, then 5mg, 08/11 and 08/12  and recheck on 08/13.     Staff Nurse states the pt is scheduled to complete his treatment soon and will be forward on to the Skilled Home Health Team for wound care.     PCP/MA notified.

## 2024-08-20 NOTE — TELEPHONE ENCOUNTER
Please contact the infusion team at [facility].      [facility] ([facility]) [phone]      option 2, then 1    Please give the verbal order that they can check patient's PT and INR once weekly.  Please call INR results to PCP.   INR goal is to be between 2 and 3.    Please obtain how patient has been taking his Coumadin since August 13, 2024.    Thanks.

## 2024-08-20 NOTE — TELEPHONE ENCOUNTER
Called Octavia from [facility] ( [employer]) 10mg right now daily is the amount of Coumadin is taking.  Current INR is 1.5 PT is 15.9 Yesterday results were 1.50.  8/6/24 results were 1.31. Octavia stated she wants to keep doing pt INR checks on Mondays.

## 2024-08-22 DIAGNOSIS — M1A.9XX0 CHRONIC GOUT WITHOUT TOPHUS, UNSPECIFIED CAUSE, UNSPECIFIED SITE: ICD-10-CM

## 2024-08-22 DIAGNOSIS — E11.69 TYPE 2 DIABETES MELLITUS WITH OTHER SPECIFIED COMPLICATION, WITHOUT LONG-TERM CURRENT USE OF INSULIN (HCC): ICD-10-CM

## 2024-08-22 NOTE — TELEPHONE ENCOUNTER
Patient is requesting refill on      allopurinol (ZYLOPRIM) 100 MG tablet [[id number]]    Order Details  Dose: 100 mg Route: Oral Frequency: DAILY   Dispense Quantity: 90 tablet Refills: 1          Sig: Take 1 tablet by mouth daily   insulin glargine (LANTUS SOLOSTAR) 100 UNIT/ML injection pen [[id number]]    Order Details  Dose, Route, Frequency: As Directed   Dispense Quantity: 5 Adjustable Dose Pre-filled Pen Syringe Refills: 5          Sig: Inject 30 units subcutaneously twice daily     Cholecalciferol (VITAMIN D) 10 MCG (400 UNIT) CAPS Capsule [[id number]]    Order Details  Dose: 400 Units Route: Oral Frequency: --   Dispense Quantity: -- Refills: --          Sig: Take 1 capsule by mouth   dulaglutide (TRULICITY) 1.5 MG/0.5ML SC injection [[id number]]  DISCONTINUED    Order Details  Dose: 1.5 mg Route: SubCUTAneous Frequency: WEEKLY   Dispense Quantity: 4 Adjustable Dose Pre-filled Pen Syringe Refills: 5          Sig: Inject 0.5 mLs into the skin once a week    Patient is also needing refills on his Hydralazine 25 mg daily,  Multi Vitamin daily  Prilosec 20 mg daily   These medications are not on patients medication list    No future appointments.

## 2024-08-26 ENCOUNTER — TELEPHONE (OUTPATIENT)
Facility: CLINIC | Age: 49
End: 2024-08-26

## 2024-08-26 NOTE — TELEPHONE ENCOUNTER
Emily with [facility] is calling to see if it is OK to check the patient's INR tomorrow  since her has a podiatry appt today that would interfere.    Emily [phone]

## 2024-08-27 DIAGNOSIS — E11.69 TYPE 2 DIABETES MELLITUS WITH OTHER SPECIFIED COMPLICATION, WITHOUT LONG-TERM CURRENT USE OF INSULIN (HCC): ICD-10-CM

## 2024-08-27 DIAGNOSIS — I10 ESSENTIAL HYPERTENSION: Primary | ICD-10-CM

## 2024-08-27 RX ORDER — OXYCODONE AND ACETAMINOPHEN 5; 325 MG/1; MG/1
1 TABLET ORAL EVERY 6 HOURS PRN
Qty: 12 TABLET | Refills: 0 | OUTPATIENT
Start: 2024-08-27 | End: 2024-08-30

## 2024-08-27 NOTE — TELEPHONE ENCOUNTER
Patient is requesting refill on    Hydralizine 25 mg tablet 1 tab daily  Muli-vitamin once daily    insulin glargine (LANTUS SOLOSTAR) 100 UNIT/ML injection pen [[id number]]    Order Details  Dose, Route, Frequency: As Directed   Dispense Quantity: 5 Adjustable Dose Pre-filled Pen Syringe Refills: 5          Sig: Inject 30 units subcutaneously twice daily       No future appointments.

## 2024-08-27 NOTE — TELEPHONE ENCOUNTER
Contact Home Health and verify that patient is taking coumadin 10 mg a night.           If so, then   Patient needs to take 12 mg on Tuesday (today) and Friday  and 10 mg all other days. With recheck of INR on Monday.    If not taking coumadin 10 mg a night.  Please get his regimen of how his coumadin is being dosed so that I can make the appropriate adjustments.

## 2024-08-28 RX ORDER — MULTIVIT-MIN/IRON FUM/FOLIC AC 7.5 MG-4
1 TABLET ORAL DAILY
Qty: 90 TABLET | Refills: 3 | Status: SHIPPED | OUTPATIENT
Start: 2024-08-28

## 2024-08-28 RX ORDER — INSULIN GLARGINE 100 [IU]/ML
INJECTION, SOLUTION SUBCUTANEOUS
Qty: 5 ADJUSTABLE DOSE PRE-FILLED PEN SYRINGE | Refills: 5 | Status: SHIPPED | OUTPATIENT
Start: 2024-08-28

## 2024-08-28 RX ORDER — HYDRALAZINE HYDROCHLORIDE 25 MG/1
TABLET, FILM COATED ORAL
Qty: 90 TABLET | Refills: 1 | Status: SHIPPED | OUTPATIENT
Start: 2024-08-28

## 2024-08-28 NOTE — TELEPHONE ENCOUNTER
Orders Placed This Encounter    insulin glargine (LANTUS SOLOSTAR) 100 UNIT/ML injection pen     Sig: Inject 30 units subcutaneously twice daily     Dispense:  5 Adjustable Dose Pre-filled Pen Syringe     Refill:  5    hydrALAZINE (APRESOLINE) 25 MG tablet     Si tablet by mouth once daily     Dispense:  90 tablet     Refill:  1    Multiple Vitamins-Minerals (MULTIVITAMIN WITH MINERALS) tablet     Sig: Take 1 tablet by mouth daily     Dispense:  90 tablet     Refill:  3

## 2024-08-28 NOTE — TELEPHONE ENCOUNTER
Home health faxed something yesterday concerning Mr. Churchill INR. PCP faxed information back responding to home health nurse.

## 2024-08-28 NOTE — TELEPHONE ENCOUNTER
Patient INR checks are currently done every Monday.  Verbal order was given earlier in a separate encounter.

## 2024-08-29 ENCOUNTER — TELEPHONE (OUTPATIENT)
Facility: CLINIC | Age: 49
End: 2024-08-29

## 2024-08-29 NOTE — TELEPHONE ENCOUNTER
Cash with [facility] is calling to let you know the patient is due for his INR on Mon 9/2, due to the holiday it will be moved to Tue 9/3 unless you want it done on 9/2.

## 2024-08-30 NOTE — TELEPHONE ENCOUNTER
Message noted.     Please inform Cash with [facility] that it is o.k to  check patient's INR on 9/3/24 since 9/2/24 is a Holiday.   Thanks.

## 2024-09-03 DIAGNOSIS — Z76.0 MEDICATION REFILL: Primary | ICD-10-CM

## 2024-09-03 NOTE — TELEPHONE ENCOUNTER
Elizabeth from [facility] called on behalf of the pt to report INR 2.2. Elizabeth would like a call back to know when would be the next recheck on behalf of the pt. Please advise

## 2024-09-03 NOTE — TELEPHONE ENCOUNTER
Orders Placed This Encounter    Cholecalciferol (VITAMIN D) 10 MCG (400 UNIT) CAPS Capsule     Sig: Take 1 capsule by mouth daily     Dispense:  90 capsule     Refill:  3

## 2024-09-03 NOTE — TELEPHONE ENCOUNTER
Patient is requesting refill on     Sodium Bicarb 650 mg    Cholecalciferol (VITAMIN D) 10 MCG (400 UNIT) CAPS Capsule [[id number]]    Order Details  Dose: 400 Units Route: Oral Frequency: --   Dispense Quantity: -- Refills: --          Sig: Take 1 capsule by mouth     No future appointments.

## 2024-09-09 ENCOUNTER — TELEPHONE (OUTPATIENT)
Facility: CLINIC | Age: 49
End: 2024-09-09

## 2024-09-09 DIAGNOSIS — R26.81 GAIT INSTABILITY: ICD-10-CM

## 2024-09-09 DIAGNOSIS — E66.01 MORBID OBESITY WITH BMI OF 60.0-69.9, ADULT (HCC): Primary | ICD-10-CM

## 2024-09-09 DIAGNOSIS — M86.171 ACUTE OSTEOMYELITIS OF RIGHT CALCANEUS (HCC): ICD-10-CM

## 2024-09-13 ENCOUNTER — TELEPHONE (OUTPATIENT)
Facility: CLINIC | Age: 49
End: 2024-09-13

## 2024-09-13 DIAGNOSIS — M86.171 ACUTE OSTEOMYELITIS OF RIGHT CALCANEUS (HCC): Primary | ICD-10-CM

## 2024-09-23 ENCOUNTER — TELEPHONE (OUTPATIENT)
Facility: CLINIC | Age: 49
End: 2024-09-23

## 2024-09-23 NOTE — TELEPHONE ENCOUNTER
Nurse from [facility] called in regards of pt INR results and it was 2.7

## 2024-09-30 ENCOUNTER — TELEPHONE (OUTPATIENT)
Facility: CLINIC | Age: 49
End: 2024-09-30

## 2024-10-07 ENCOUNTER — TELEPHONE (OUTPATIENT)
Facility: CLINIC | Age: 49
End: 2024-10-07

## 2024-10-07 NOTE — TELEPHONE ENCOUNTER
Elizabeth from [facility] called and gave the INR results for the Pt and she stated it was 2.3.

## 2024-10-09 ENCOUNTER — TELEPHONE (OUTPATIENT)
Facility: CLINIC | Age: 49
End: 2024-10-09

## 2024-10-09 NOTE — TELEPHONE ENCOUNTER
Elizabeth called stating Mr. Churchill has not received Bariatric wheelchair. Advised I will follow up with MD. MD advised me to fax order to [facility]. We tried reaching out to [facility] over a month ago and no response resending fax today.

## 2024-10-18 ENCOUNTER — ANTI-COAG VISIT (OUTPATIENT)
Facility: CLINIC | Age: 49
End: 2024-10-18

## 2024-10-18 LAB
INR, EXTERNAL: 3.1
PT, EXTERNAL: 0

## 2024-10-18 RX ORDER — ENOXAPARIN SODIUM 100 MG/ML
60 INJECTION SUBCUTANEOUS 2 TIMES DAILY
COMMUNITY
Start: 2024-10-15 | End: 2024-10-18

## 2024-10-18 RX ORDER — WARFARIN SODIUM 10 MG/1
10 TABLET ORAL DAILY
COMMUNITY
Start: 2024-10-03

## 2024-10-18 NOTE — PROGRESS NOTES
Received a return 3-way call from Jessica/[facility] to follow up on [employer] wheelchair status and PT/INR dosing. Updated patient contact telephone number provided. PCP's MA addressed the PT/INR dosing and wheelchair concerns.

## 2024-10-18 NOTE — PROGRESS NOTES
Attempted to contact Elizabeth from [facility] to advised MD verbal order.     Per MD verbal order is to hold medication (Warfarin) Tonight 10/18/2024 and Tomorrow 10/19/2024. Sunday half pill of Warfarin. Monday please recheck INR.

## 2024-10-18 NOTE — PROGRESS NOTES
Received a call from SN Rina with  [facility] to report PT/INR today 3.1. Staff states the pt is currently taking Warfarin 10mg and Lovenox 60mg. Staff states he has on hand warfarin 10mg tablets and two days left of Lovenox therapy after today.     Pt was recently admitted to [facility] for Sepsis from 10/10/2024 - 10/15/2024. No upcoming appointment is noted at this time. Additionally, the Staff inquires the status of the bariatric wheelchair. Staff states the order was originally sent to [employer](DME) and then [facility] (DME).  Staff states the pt is unable to ambulate in his current wheelchair due to its small size. Staff informed will notify PCP/MA of these findings.    Chart reviewed. Multiple attempts for [facility] to reach out to the patient regarding bariatric wheelchair however it is noted the patient's telephone numbers were disconnected. Call placed to [facility] staff, phone number busy x2. Call placed to the pt's current listed phone numbers 1- disconnected and 1 wrong phone number. Call placed to [facility] and spoke with Malini/Jessica for the  to return call regarding this patient.

## 2024-10-22 ENCOUNTER — TELEPHONE (OUTPATIENT)
Facility: CLINIC | Age: 49
End: 2024-10-22

## 2024-10-22 RX ORDER — INSULIN GLARGINE 100 [IU]/ML
INJECTION, SOLUTION SUBCUTANEOUS
Qty: 5 ADJUSTABLE DOSE PRE-FILLED PEN SYRINGE | Refills: 5 | OUTPATIENT
Start: 2024-10-22

## 2024-10-22 RX ORDER — ALLOPURINOL 100 MG/1
100 TABLET ORAL DAILY
Qty: 90 TABLET | Refills: 3 | OUTPATIENT
Start: 2024-10-22

## 2024-10-22 NOTE — TELEPHONE ENCOUNTER
Robert with [facility] called asking did we have a BMP on Patient advised Patient has not been seen since May in our office. Robert stated he will do an BMP and Give INR results tomorrow.

## 2024-10-25 LAB
BUN BLDV-MCNC: 24 MG/DL
CALCIUM SERPL-MCNC: 9.3 MG/DL
CHLORIDE BLD-SCNC: 104 MMOL/L
CO2: 24 MMOL/L
CREAT SERPL-MCNC: 2.3 MG/DL
EGFR: 33.6
GLUCOSE BLD-MCNC: 143 MG/DL
POTASSIUM SERPL-SCNC: 4.9 MMOL/L
SODIUM BLD-SCNC: 138 MMOL/L

## 2024-10-28 ENCOUNTER — TELEPHONE (OUTPATIENT)
Facility: CLINIC | Age: 49
End: 2024-10-28

## 2024-10-28 NOTE — TELEPHONE ENCOUNTER
Devika with [facility] is calling to give the patients INR.  States his INR today was 1.9, patient is currently taking 10mg warfarin daily.

## 2024-10-29 ENCOUNTER — TELEPHONE (OUTPATIENT)
Facility: CLINIC | Age: 49
End: 2024-10-29

## 2024-10-29 NOTE — TELEPHONE ENCOUNTER
Pt returned my call in regards to MD note, two pt identifiers were provided. Pt understood and stated he were already following up with a Nephrologist.

## 2024-10-29 NOTE — TELEPHONE ENCOUNTER
I have attempt to contact this pt in regards to the MD note, Pt didn't answer lvm for pt to return my call.

## 2024-10-29 NOTE — TELEPHONE ENCOUNTER
Contacted sandra and relayed MD verbal patient is to continue his 10 mg of Coumadin once daily.  Recheck PT/INR in 1 week. Sandra Verbalized understanding.

## 2024-10-29 NOTE — TELEPHONE ENCOUNTER
PT/INR results noted.   Please contact the home health nurse and let know that patient is to continue his 10 mg of Coumadin once daily.  Recheck PT/INR in 1 week.   LABS:                         15.0   13.29 )-----------( 244      ( 03 Dec 2019 00:04 )             46.9     12-03    132<L>  |  96  |  28<H>  ----------------------------<  370<H>  5.4<H>   |  20<L>  |  1.48<H>    Ca    10.2      03 Dec 2019 00:04    TPro  7.7  /  Alb  4.0  /  TBili  0.9  /  DBili  x   /  AST  98<H>  /  ALT  37  /  AlkPhos  89  12-03    PT/INR - ( 03 Dec 2019 00:04 )   PT: 17.8 sec;   INR: 1.55          PTT - ( 03 Dec 2019 00:04 )  PTT:163.4 sec    CARDIAC MARKERS ( 03 Dec 2019 00:04 )  x     / 3.18 ng/mL / 1039 U/L / x     / 55.3 ng/mL        Lactate, Blood: 3.2 mmoL/L (12-02 @ 23:55)      RADIOLOGY, EKG & ADDITIONAL TESTS: Reviewed.

## 2024-11-01 ENCOUNTER — TELEPHONE (OUTPATIENT)
Facility: CLINIC | Age: 49
End: 2024-11-01

## 2024-11-01 NOTE — TELEPHONE ENCOUNTER
Lou with [facility] called to report that the patient missed his appt today due to having another appt.     Lou asked for a return call.

## 2024-11-04 ENCOUNTER — TELEPHONE (OUTPATIENT)
Facility: CLINIC | Age: 49
End: 2024-11-04

## 2024-11-04 NOTE — TELEPHONE ENCOUNTER
Elizabeth from [facility] called in regards to the pt INR results and said he was at a 1.9 today.

## 2024-11-05 NOTE — TELEPHONE ENCOUNTER
Message noted.     Please contact Lou from [facility].  She asked that someone returns her call.   Please find out what she needs.    Thanks.

## 2024-11-05 NOTE — TELEPHONE ENCOUNTER
Returned call Lou did not want anything she said. She stated \" did we have any orders or anything if not, she's not use to receiving a call this many days later\". She just noted patient missed the visit.

## 2024-11-06 ENCOUNTER — TELEPHONE (OUTPATIENT)
Facility: CLINIC | Age: 49
End: 2024-11-06

## 2024-11-06 DIAGNOSIS — Z79.01 CURRENT USE OF LONG TERM ANTICOAGULATION: Primary | ICD-10-CM

## 2024-11-06 DIAGNOSIS — Z51.81 ENCOUNTER FOR THERAPEUTIC DRUG LEVEL MONITORING: ICD-10-CM

## 2024-11-06 DIAGNOSIS — I26.99 PULMONARY EMBOLISM AND INFARCTION (HCC): ICD-10-CM

## 2024-11-06 NOTE — TELEPHONE ENCOUNTER
Message noted.  Continue current coumadin dose at  10 mg a  day.  Recheck in 1 week as previously ordered. INR called to MD.

## 2024-11-06 NOTE — TELEPHONE ENCOUNTER
Elizabeth from [facility] called on behalf of the pt to inform the pt is getting discharged next week not sure what day next week, because his wound is healed. And pt will come to his PCP to get his INR check.

## 2024-11-07 NOTE — TELEPHONE ENCOUNTER
Message noted.  Patient can continue getting PT and INR checked every Monday.  Patient would have to come to the office.  Order for AMB  PT/INR placed.   Patient currently taking 10 mg a night of Coumadin  I will be out of the office next week starting Wednesday NOV. 13--17th.   I will ask Dr. Flowers, Bienvenido and Dante to cover my patient's while I am out.  On November 14th, I will not be reachable.       Please contact Elizabeth and let her know if patient is not released on Monday from Home Health to perform his PT/INR and call results to MD.     Inform her that patient can come in EACH Monday for PT/INR check.   Order has been placed in chart.

## 2024-11-13 ENCOUNTER — TELEPHONE (OUTPATIENT)
Facility: CLINIC | Age: 49
End: 2024-11-13

## 2024-11-13 DIAGNOSIS — Z79.01 CURRENT USE OF LONG TERM ANTICOAGULATION: ICD-10-CM

## 2024-11-13 DIAGNOSIS — E11.69 TYPE 2 DIABETES MELLITUS WITH OTHER SPECIFIED COMPLICATION, WITHOUT LONG-TERM CURRENT USE OF INSULIN (HCC): ICD-10-CM

## 2024-11-13 DIAGNOSIS — I10 ESSENTIAL HYPERTENSION: ICD-10-CM

## 2024-11-13 DIAGNOSIS — M14.60 CHARCOT ARTHROPATHY: ICD-10-CM

## 2024-11-13 DIAGNOSIS — R26.81 GAIT INSTABILITY: ICD-10-CM

## 2024-11-13 DIAGNOSIS — E66.01 MORBID OBESITY WITH BMI OF 60.0-69.9, ADULT: ICD-10-CM

## 2024-11-13 DIAGNOSIS — Z87.39 HISTORY OF OSTEOMYELITIS: Primary | ICD-10-CM

## 2024-11-13 DIAGNOSIS — Z86.711 PERSONAL HISTORY OF PULMONARY EMBOLISM: ICD-10-CM

## 2024-11-13 NOTE — TELEPHONE ENCOUNTER
Called Elizabeth and relayed MD message. Patient is still not discharged as of yet. She stated \" maybe they will check his INR when they discharge patient\".

## 2024-11-13 NOTE — TELEPHONE ENCOUNTER
Patient is requesting to have referral to physical therapy for weight bearing please review chart and submit referral

## 2024-11-14 ENCOUNTER — COMMUNITY OUTREACH (OUTPATIENT)
Facility: CLINIC | Age: 49
End: 2024-11-14

## 2024-11-14 NOTE — TELEPHONE ENCOUNTER
Chart reviewed, but please obtain more information. Notes list him as RLE non-weight-bearing. Is he still following with podiatry or ortho?     Recommend a hospital follow-up appt with PCP. Please schedule.

## 2024-11-21 NOTE — TELEPHONE ENCOUNTER
Please inform patient that referral for [facility] regarding weightbearing was placed.

## 2024-11-21 NOTE — TELEPHONE ENCOUNTER
Orders Placed This Encounter    BS - [facility] - [facility]     Referral Priority:   Routine     Referral Type:   Eval and Treat     Referral Reason:   Specialty Services Required     Requested Specialty:   Physical Therapy     Number of Visits Requested:   1

## 2024-12-09 ENCOUNTER — HOSPITAL ENCOUNTER (OUTPATIENT)
Facility: HOSPITAL | Age: 49
Setting detail: RECURRING SERIES
Discharge: HOME | End: 2024-12-12
Payer: MEDICAID

## 2024-12-09 PROCEDURE — 97162 PT EVAL MOD COMPLEX 30 MIN: CPT

## 2024-12-09 NOTE — PROGRESS NOTES
PHYSICAL / OCCUPATIONAL THERAPY - DAILY TREATMENT NOTE AND NEURO EVALUATION    Patient Name: Temo Churchill    Date: 2024    : 1975  Ins[facility]: [facility] MEDICA[facility] / Plan: [facility] CARDINAL CARE / Product Type: *No Product type* /      Patient  verified Yes     Visit #   Current / Total 1 10   Time   In / Out 11:06 11:44   Pain   In / Out 0 0   Subjective Functional Status/Changes: See Plan of Care     TREATMENT AREA =  Gait instability [R26.81]    SUBJECTIVE  History/Mechanism of Injury: Temo Churchill is a 49 y.o. male with Dx of Gait instability [R26.81].  Right foot surgery 24 due to infection; DM with would healing issues. Ambulatory prior to surgery. NWB post-op until 24. Currently using stair lift from transportation services to get in and out of home. In apartment with mother, mostly bed bound, using pad and urinal in bed, not using restroom. Able to sit independently, independent bed mobility and transfers supine to sit. Able to stand for brief periods, non ambulatory.   Prior level of function: prior to 2024 ambulatory without AD, not working  Comorbidities/Allergies: morbid obesity 60-69 BMI, DM, HTN, gout, hx PE, hx osteomyelitis, Charcot arthropathy; thyroid surgery ; allergies reviewed in chart  Diagnostic Tests: see chart review for extensive recent medical history  Pain: Not reporting chronic pain and not taking pain medication; left knee OA with intermittent pain  Functional Deficits: non-ambulatory, unable to stand upright, standing tolerance <30 seconds, difficulty transferring to bedside commode, unable to negotiate steps, unable to squat and lift  Previous Treatment: no prior PT due to NWB  Mobility Devices: 2 wheeled walker, bedside commode, hospital bed   Falls: none reported  Living Situation: apartment with mother, 13 [address] with one handrail  Work History: not working  Social/Recreational Activities: none reported, goals for household and 
necessary.    Physician's Signature:_________________________   DATE:_________   TIME:________                           Catalina Beaulieu MD    ** Signature, Date and Time must be completed for valid certification **  [facility] or you may fax the signed copy to [phone].  Thank you.

## 2024-12-23 DIAGNOSIS — M1A.9XX0 CHRONIC GOUT WITHOUT TOPHUS, UNSPECIFIED CAUSE, UNSPECIFIED SITE: ICD-10-CM

## 2024-12-23 RX ORDER — ALLOPURINOL 100 MG/1
100 TABLET ORAL DAILY
Qty: 90 TABLET | Refills: 1 | Status: SHIPPED | OUTPATIENT
Start: 2024-12-23

## 2024-12-23 NOTE — TELEPHONE ENCOUNTER
Last Appointment:  5/28/2024  Future Appointments   Date Time Provider Department Center   1/6/2025 12:20 PM Christen Mars, PT [facility]   1/8/2025 12:20 PM Christen Mars, PT [facility]   1/13/2025 12:20 PM Carlos Guevara, [facility]   1/15/2025 11:00 AM Carlos Guevara PTA [facility]   1/20/2025 11:40 AM Carlos Guevara, PTA [facility]   1/22/2025 12:20 PM Carlos Guevara, PTA [facility]   1/27/2025 12:20 PM Carlos Guevara, PTA [facility]   1/29/2025 12:20 PM Christen Mars, PT [facility]

## 2024-12-23 NOTE — TELEPHONE ENCOUNTER
Patient is requesting a refill for     allopurinol (ZYLOPRIM) 100 MG tablet    To be sent to pharmacy on file

## 2024-12-24 NOTE — TELEPHONE ENCOUNTER
Orders Placed This Encounter    allopurinol (ZYLOPRIM) 100 MG tablet     Sig: Take 1 tablet by mouth daily     Dispense:  90 tablet     Refill:  1

## 2025-01-06 ENCOUNTER — COMMUNITY OUTREACH (OUTPATIENT)
Facility: CLINIC | Age: 50
End: 2025-01-06

## 2025-01-06 ENCOUNTER — HOSPITAL ENCOUNTER (OUTPATIENT)
Facility: HOSPITAL | Age: 50
Setting detail: RECURRING SERIES
Discharge: HOME | End: 2025-01-09
Payer: MEDICAID

## 2025-01-06 PROCEDURE — 97530 THERAPEUTIC ACTIVITIES: CPT

## 2025-01-06 PROCEDURE — 97112 NEUROMUSCULAR REEDUCATION: CPT

## 2025-01-06 PROCEDURE — 97110 THERAPEUTIC EXERCISES: CPT

## 2025-01-06 PROCEDURE — 97116 GAIT TRAINING THERAPY: CPT

## 2025-01-06 NOTE — PROGRESS NOTES
Patient's  shows they are overdue for Hemoglobin A1C  Care Everywhere and  files searched.   updated with 8/4/24 and 5/13/24 A1C results.

## 2025-01-08 ENCOUNTER — HOSPITAL ENCOUNTER (OUTPATIENT)
Facility: HOSPITAL | Age: 50
Setting detail: RECURRING SERIES
Discharge: HOME | End: 2025-01-11
Payer: MEDICAID

## 2025-01-08 PROCEDURE — 97530 THERAPEUTIC ACTIVITIES: CPT

## 2025-01-08 PROCEDURE — 97116 GAIT TRAINING THERAPY: CPT

## 2025-01-08 PROCEDURE — 97110 THERAPEUTIC EXERCISES: CPT

## 2025-01-08 NOTE — PROGRESS NOTES
minutes to improve tolerance to household chores including light meal preparation.  Status at last assessment: 45 seconds  3. Patient will demonstrate 5 repetitions  from WC to RW for 30 second STS Test to prepare for squatting and lifting during household tasks.  Status at last assessment: 5 repetitions  4.  Patient will increase LEFS Score to 17/80 to improve tolerance to standing and walking during household tasks.  Status at last assessment: 7/80    Frequency / Duration:   Patient to be seen   2   times per week for   5    WEEKS    RECOMMENDATIONS  Patient would benefit from the continuation of skilled rehab interventions for functional progress to achieving above stated clinically significant goals.  Continue per initial Plan of Care.    If you have any questions/comments please contact us directly.  Thank you for allowing us to assist in the care of your patient.    Christen Mars, PT       1/8/2025       12:59 PM

## 2025-01-08 NOTE — PROGRESS NOTES
Extension 4 4   Ankle PF 4 4-   Ankle DF 4 4      30 Second STS: from 24 inch bariatric mat table with RW x 5 repetitions     Standing: patient able to transfer independently from 24 inch mat table to RW and maintain standing x 45 seconds     Gait: non ambulatory        Patient will continue to benefit from skilled PT / OT services to modify and progress therapeutic interventions, analyze and address functional mobility deficits, analyze and address ROM deficits, analyze and address strength deficits, analyze and address soft tissue restrictions, analyze and cue for proper movement patterns, and analyze and modify for postural abnormalities to address functional deficits and attain remaining goals.    Progress toward goals / Updated goals:  []  See Progress Note/Recertification    New Goals to be achieved in __5__ WEEKS  1. Patient will ambulate 2 x 40 feet with RW to improve household ambulation during household chores.  Status at last assessment: unable  2. Patient will increase standing tolerance to 5 minutes to improve tolerance to household chores including light meal preparation.  Status at last assessment: 45 seconds  3. Patient will demonstrate 5 repetitions  from WC to RW for 30 second STS Test to prepare for squatting and lifting during household tasks.  Status at last assessment: 5 repetitions  4.  Patient will increase LEFS Score to 17/80 to improve tolerance to standing and walking during household tasks.  Status at last assessment: 7/80     PN Due 2/8/25  Auth 36 visits expires 3/28/25    If an interpreting service was utilized for treatment of this patient, the contents of this document represent the material reviewed with the patient via the .    PLAN  Yes  Continue plan of care  []  Upgrade activities as tolerated  []  Discharge due to :  []  Other:    Christen Mars PT    1/8/2025    12:37 PM    Future Appointments   Date Time Provider Department Center   1/13/2025 12:20 PM Paola

## 2025-01-13 ENCOUNTER — HOSPITAL ENCOUNTER (OUTPATIENT)
Facility: HOSPITAL | Age: 50
Setting detail: RECURRING SERIES
Discharge: HOME | End: 2025-01-16
Payer: MEDICAID

## 2025-01-13 PROCEDURE — 97110 THERAPEUTIC EXERCISES: CPT

## 2025-01-13 PROCEDURE — 97112 NEUROMUSCULAR REEDUCATION: CPT

## 2025-01-13 PROCEDURE — 97530 THERAPEUTIC ACTIVITIES: CPT

## 2025-01-13 NOTE — PROGRESS NOTES
units; 68-82 min = 5 units   Total Total     [x]  Patient Education billed concurrently with other procedures   [x] Review HEP    [] Progressed/Changed HEP, detail:    [] Other detail:         Objective Information/Functional Measures/Assessment  Patient with standing tolerance limited to 45 seconds at one time using (B) UE assist. Unable to achieve forward step with either LE leading without increased right knee pain, therefore, modified standing walking attempts to weight-shifting with better tolerance.      Patient will continue to benefit from skilled PT / OT services to modify and progress therapeutic interventions, analyze and address functional mobility deficits, analyze and address strength deficits, analyze and cue for proper movement patterns, analyze and modify for postural abnormalities, analyze and address imbalance/dizziness, and instruct in home and community integration to address functional deficits and attain remaining goals.      Progress toward goals / Updated goals:  []  See Progress Note/Recertification    1. Patient will ambulate 2 x 40 feet with RW to improve household ambulation during household chores.  Status at last assessment: unable  2. Patient will increase standing tolerance to 5 minutes to improve tolerance to household chores including light meal preparation.  Status at last assessment: 45 seconds  Current: goal progressing; remain 45 seconds (1/13/25 - JQ, PTA)  3. Patient will demonstrate 5 repetitions  from WC to RW for 30 second STS Test to prepare for squatting and lifting during household tasks.  Status at last assessment: 5 repetitions  4.  Patient will increase LEFS Score to 17/80 to improve tolerance to standing and walking during household tasks.  Status at last assessment: 7/80      Next PN/RC due 2/8/25  Authorization due (visit number/date) 36 visits / 3-28-25    PLAN  - Continue Plan of Care  - Upgrade activities as tolerated      If an interpreting service was

## 2025-01-15 ENCOUNTER — HOSPITAL ENCOUNTER (OUTPATIENT)
Facility: HOSPITAL | Age: 50
Setting detail: RECURRING SERIES
Discharge: HOME | End: 2025-01-18
Payer: MEDICAID

## 2025-01-15 PROCEDURE — 97530 THERAPEUTIC ACTIVITIES: CPT

## 2025-01-15 PROCEDURE — 97110 THERAPEUTIC EXERCISES: CPT

## 2025-01-15 PROCEDURE — 97112 NEUROMUSCULAR REEDUCATION: CPT

## 2025-01-20 ENCOUNTER — HOSPITAL ENCOUNTER (OUTPATIENT)
Facility: HOSPITAL | Age: 50
Setting detail: RECURRING SERIES
Discharge: HOME | End: 2025-01-23
Payer: MEDICAID

## 2025-01-20 PROCEDURE — 97530 THERAPEUTIC ACTIVITIES: CPT

## 2025-01-20 PROCEDURE — 97112 NEUROMUSCULAR REEDUCATION: CPT

## 2025-01-20 PROCEDURE — 97116 GAIT TRAINING THERAPY: CPT

## 2025-01-20 PROCEDURE — 97110 THERAPEUTIC EXERCISES: CPT

## 2025-01-20 NOTE — PROGRESS NOTES
PHYSICAL / OCCUPATIONAL THERAPY - DAILY TREATMENT NOTE (updated )    Patient Name: Temo Churchill    Date: 2025    : 1975  Ins[facility]: [facility] / Plan: [facility] PLAN CARDINAL CARE / Product Type: *No Product type* /      Patient  verified Yes     Visit #   Current / Total 3 10   Time   In / Out 12:00 12:32   Pain   In / Out 8/10 R knee in standing 0/10   Subjective Functional Status/Changes: \"I can't put my right foot down all the way flat.\"     TREATMENT AREA =  Gait instability [R26.81]    OBJECTIVE      Therapeutic Procedures:  Tx Min  Procedure, Rationale, Specifics   8  18423 Therapeutic Exercise (timed):  increase ROM, strength, coordination, balance, and proprioception to improve patient's ability to progress to PLOF and address remaining functional goals. (see flow sheet as applicable)     Details if applicable:  LE strengthening     8  12588 Neuromuscular Re-Education (timed):  improve balance, coordination, kinesthetic sense, posture, core stability and proprioception to improve patient's ability to develop conscious control of individual muscles and awareness of position of extremities in order to progress to PLOF and address remaining functional goals. (see flow sheet as applicable)     Details if applicable:  quad and glute re-ed     8  54051 Therapeutic Activity (timed):  use of dynamic activities replicating functional movements to increase ROM, strength, coordination, balance, and proprioception in order to improve patient's ability to progress to PLOF and address remaining functional goals.  (see flow sheet as applicable)     Details if applicable:  sit to stands, standing in parallel bars    8  61368 Gait Training (timed):    0 feet with WC follow in parallel bars (assistive device) over level surfaces with supervision level of assist. Cuing for upright posture.  To improve safety and dynamic movement with household/community ambulation.  (see flow

## 2025-01-22 ENCOUNTER — HOSPITAL ENCOUNTER (OUTPATIENT)
Facility: HOSPITAL | Age: 50
Setting detail: RECURRING SERIES
Discharge: HOME | End: 2025-01-25
Payer: MEDICAID

## 2025-01-22 PROCEDURE — 97110 THERAPEUTIC EXERCISES: CPT

## 2025-01-22 PROCEDURE — 97116 GAIT TRAINING THERAPY: CPT

## 2025-01-22 PROCEDURE — 97112 NEUROMUSCULAR REEDUCATION: CPT

## 2025-01-22 PROCEDURE — 97530 THERAPEUTIC ACTIVITIES: CPT

## 2025-01-22 NOTE — PROGRESS NOTES
tasks.  Status at last assessment: 5 repetitions  4.  Patient will increase LEFS Score to 17/80 to improve tolerance to standing and walking during household tasks.  Status at last assessment: 7/80    PN Due 2/8/25  Auth 36 visits expires 3/28/25    If an interpreting service was utilized for treatment of this patient, the contents of this document represent the material reviewed with the patient via the .    PLAN  Yes  Continue plan of care  []  Upgrade activities as tolerated  []  Discharge due to :      Christen Mars PT    1/22/2025    12:43 PM    Future Appointments   Date Time Provider Department Center   1/27/2025 12:20 PM [facility]   1/27/2025  3:30 PM Catalina Beaulieu MD A [facility]   1/29/2025 12:20 PM Christen Mars, PT [facility]

## 2025-01-27 ENCOUNTER — OFFICE VISIT (OUTPATIENT)
Facility: CLINIC | Age: 50
End: 2025-01-27
Payer: MEDICAID

## 2025-01-27 ENCOUNTER — HOSPITAL ENCOUNTER (OUTPATIENT)
Facility: HOSPITAL | Age: 50
Setting detail: RECURRING SERIES
Discharge: HOME | End: 2025-01-30
Payer: MEDICAID

## 2025-01-27 VITALS
WEIGHT: 315 LBS | DIASTOLIC BLOOD PRESSURE: 82 MMHG | SYSTOLIC BLOOD PRESSURE: 138 MMHG | HEIGHT: 72 IN | BODY MASS INDEX: 42.66 KG/M2 | TEMPERATURE: 97 F

## 2025-01-27 DIAGNOSIS — I26.99 PULMONARY EMBOLISM AND INFARCTION (HCC): ICD-10-CM

## 2025-01-27 DIAGNOSIS — E66.01 MORBID OBESITY WITH BMI OF 60.0-69.9, ADULT: ICD-10-CM

## 2025-01-27 DIAGNOSIS — I10 ESSENTIAL HYPERTENSION: ICD-10-CM

## 2025-01-27 DIAGNOSIS — Z76.0 MEDICATION REFILL: ICD-10-CM

## 2025-01-27 DIAGNOSIS — M1A.9XX0 CHRONIC GOUT WITHOUT TOPHUS, UNSPECIFIED CAUSE, UNSPECIFIED SITE: ICD-10-CM

## 2025-01-27 DIAGNOSIS — E78.5 HYPERLIPIDEMIA, UNSPECIFIED HYPERLIPIDEMIA TYPE: ICD-10-CM

## 2025-01-27 DIAGNOSIS — R53.1 GENERALIZED WEAKNESS: ICD-10-CM

## 2025-01-27 DIAGNOSIS — E11.69 TYPE 2 DIABETES MELLITUS WITH OTHER SPECIFIED COMPLICATION, WITHOUT LONG-TERM CURRENT USE OF INSULIN (HCC): Primary | ICD-10-CM

## 2025-01-27 DIAGNOSIS — Z79.01 CURRENT USE OF LONG TERM ANTICOAGULATION: ICD-10-CM

## 2025-01-27 PROBLEM — E11.9 DIABETES MELLITUS (HCC): Status: ACTIVE | Noted: 2025-01-27

## 2025-01-27 LAB
POC INR: 2.8
PROTHROMBIN TIME, POC: 33.7

## 2025-01-27 PROCEDURE — 97530 THERAPEUTIC ACTIVITIES: CPT

## 2025-01-27 PROCEDURE — 97116 GAIT TRAINING THERAPY: CPT

## 2025-01-27 PROCEDURE — 97112 NEUROMUSCULAR REEDUCATION: CPT

## 2025-01-27 PROCEDURE — 97110 THERAPEUTIC EXERCISES: CPT

## 2025-01-27 PROCEDURE — 99214 OFFICE O/P EST MOD 30 MIN: CPT | Performed by: FAMILY MEDICINE

## 2025-01-27 PROCEDURE — 3079F DIAST BP 80-89 MM HG: CPT | Performed by: FAMILY MEDICINE

## 2025-01-27 PROCEDURE — 3075F SYST BP GE 130 - 139MM HG: CPT | Performed by: FAMILY MEDICINE

## 2025-01-27 RX ORDER — DULAGLUTIDE 3 MG/.5ML
3 INJECTION, SOLUTION SUBCUTANEOUS WEEKLY
Qty: 2 ML | Refills: 5 | Status: SHIPPED | OUTPATIENT
Start: 2025-01-27

## 2025-01-27 RX ORDER — ATORVASTATIN CALCIUM 40 MG/1
40 TABLET, FILM COATED ORAL DAILY
Qty: 90 TABLET | Refills: 1 | Status: SHIPPED | OUTPATIENT
Start: 2025-01-27

## 2025-01-27 RX ORDER — AMLODIPINE BESYLATE 10 MG/1
10 TABLET ORAL DAILY
Qty: 90 TABLET | Refills: 0 | Status: SHIPPED | OUTPATIENT
Start: 2025-01-27

## 2025-01-27 RX ORDER — DULAGLUTIDE 3 MG/.5ML
3 INJECTION, SOLUTION SUBCUTANEOUS WEEKLY
COMMUNITY
Start: 2025-01-07 | End: 2025-01-27 | Stop reason: SDUPTHER

## 2025-01-27 RX ORDER — PHENTERMINE HYDROCHLORIDE 37.5 MG/1
37.5 CAPSULE ORAL DAILY
Qty: 30 CAPSULE | Refills: 2 | Status: SHIPPED | OUTPATIENT
Start: 2025-01-27 | End: 2025-04-27

## 2025-01-27 RX ORDER — WARFARIN SODIUM 10 MG/1
10 TABLET ORAL DAILY
Qty: 90 TABLET | Refills: 1 | Status: SHIPPED | OUTPATIENT
Start: 2025-01-27

## 2025-01-27 RX ORDER — MULTIVIT-MIN/IRON FUM/FOLIC AC 7.5 MG-4
1 TABLET ORAL DAILY
Qty: 90 TABLET | Refills: 3 | Status: SHIPPED | OUTPATIENT
Start: 2025-01-27

## 2025-01-27 RX ORDER — WARFARIN SODIUM 3 MG/1
TABLET ORAL DAILY
COMMUNITY

## 2025-01-27 RX ORDER — BLOOD SUGAR DIAGNOSTIC
STRIP MISCELLANEOUS
Qty: 100 EACH | Refills: 5 | Status: SHIPPED | OUTPATIENT
Start: 2025-01-27

## 2025-01-27 RX ORDER — LISINOPRIL 10 MG/1
10 TABLET ORAL DAILY
Qty: 90 TABLET | Refills: 1 | Status: SHIPPED | OUTPATIENT
Start: 2025-01-27

## 2025-01-27 RX ORDER — INSULIN LISPRO 200 [IU]/ML
10 INJECTION, SOLUTION SUBCUTANEOUS
Qty: 2 ADJUSTABLE DOSE PRE-FILLED PEN SYRINGE | Refills: 5 | Status: SHIPPED | OUTPATIENT
Start: 2025-01-27

## 2025-01-27 SDOH — ECONOMIC STABILITY: FOOD INSECURITY: WITHIN THE PAST 12 MONTHS, THE FOOD YOU BOUGHT JUST DIDN'T LAST AND YOU DIDN'T HAVE MONEY TO GET MORE.: NEVER TRUE

## 2025-01-27 SDOH — ECONOMIC STABILITY: FOOD INSECURITY: WITHIN THE PAST 12 MONTHS, YOU WORRIED THAT YOUR FOOD WOULD RUN OUT BEFORE YOU GOT MONEY TO BUY MORE.: NEVER TRUE

## 2025-01-27 ASSESSMENT — PATIENT HEALTH QUESTIONNAIRE - PHQ9
SUM OF ALL RESPONSES TO PHQ9 QUESTIONS 1 & 2: 0
SUM OF ALL RESPONSES TO PHQ QUESTIONS 1-9: 0
1. LITTLE INTEREST OR PLEASURE IN DOING THINGS: NOT AT ALL
2. FEELING DOWN, DEPRESSED OR HOPELESS: NOT AT ALL
SUM OF ALL RESPONSES TO PHQ QUESTIONS 1-9: 0

## 2025-01-27 ASSESSMENT — ENCOUNTER SYMPTOMS: SHORTNESS OF BREATH: 0

## 2025-01-27 NOTE — PROGRESS NOTES
Temo Churchill (:  1975) is a 49 y.o. male,Established patient, here for evaluation of the following chief complaint(s):  Follow-up         Assessment & Plan  Type 2 diabetes mellitus with other specified complication, without long-term current use of insulin (HCC)  -Chronic, Uncontrolled.   Last A1c was 8.8 in 2024.  This was decreased from 9.0 in May 2024.    -He is currently taking Humalog 10 units with meals and Lantus 30 units twice a day.    -On Trulicity 3 mg once weekly dose.   Orders:   REFILL ONETOUCH ULTRA strip; USE A NEW TEST STRIP TO CHECK BLOOD GLUCOSE ONCE A DAY   REFILL insulin lispro (HUMALOG KWIKPEN) 200 UNIT/ML SOPN pen; Inject 10 Units into the skin 3 times daily (with meals)   REFILL TRULICITY 3 MG/0.5ML SOAJ; Inject 3 mg into the skin Once a week at 5 PM    Hemoglobin A1C; Future    Comprehensive Metabolic Panel; Future    Lipid Panel; Future    Essential hypertension  Controlled on Amlodipine 10 mg once daily and lisinopril 10 mg once daily.     Patient  Stopped hydralazine 25mg qd d/t patient states he was hallucinating       Orders:   REFILL  amLODIPine (NORVASC) 10 MG tablet; Take 1 tablet by mouth daily   REFILL  lisinopril (PRINIVIL;ZESTRIL) 10 MG tablet; Take 1 tablet by mouth daily    Comprehensive Metabolic Panel; Future    Lipid Panel; Future    Pulmonary embolism and infarction (HCC)  Orders:    REFILL warfarin (COUMADIN) 10 MG tablet; Take 1 tablet by mouth daily    Current use of long term anticoagulation  -On Coumadin 10 mg every night except for Wednesday night.    -Takes Coumadin 3 mg on Wednesday night         Hyperlipidemia, unspecified hyperlipidemia type  Stable on atorvastatin 40 mg a day    Orders:    Comprehensive Metabolic Panel; Future    Lipid Panel; Future    Chronic gout without tophus, unspecified cause, unspecified site  Stable on allopurinol 100 mg once daily       Generalized weakness  Continue physical therapy Mon and Wednesday.    He is

## 2025-01-27 NOTE — PROGRESS NOTES
using total billable min of TIMED therapeutic procedures (example: do not include dry needle or estim unattended, both untimed codes, in totals to left)  8-22 min = 1 unit; 23-37 min = 2 units; 38-52 min = 3 units; 53-67 min = 4 units; 68-82 min = 5 units   Total     [x]  Patient Education billed concurrently with other procedures   [x] Review HEP    [] Progressed/Changed HEP, detail:    [] Other detail:         Objective Information/Functional Measures/Assessment  Patient able to demonstrate ability to ambulate 5 feet using FWW, limited primarily due to right knee pain/OA. Minimal improvement reported in right knee pain upon cueing for maintenance of upright posture as to decrease patellar shearing.        Patient will continue to benefit from skilled PT / OT services to modify and progress therapeutic interventions, analyze and address functional mobility deficits, analyze and address ROM deficits, analyze and address strength deficits, analyze and address soft tissue restrictions, analyze and cue for proper movement patterns, and analyze and modify for postural abnormalities to address functional deficits and attain remaining goals.     Progress toward goals / Updated goals:  []  See Progress Note/Recertification  1. Patient will ambulate 2 x 40 feet with RW to improve household ambulation during household chores.  Status at last assessment: unable  Current: goal progressing; 5 feet x 2 using RW/FWW (1/27/25 - JQ, PTA)  2. Patient will increase standing tolerance to 5 minutes to improve tolerance to household chores including light meal preparation.  Status at last assessment: 45 seconds  Current: able to stand for 75 seconds x 2 with seated rest break (1/22/25)  3. Patient will demonstrate 5 repetitions  from WC to RW for 30 second STS Test to prepare for squatting and lifting during household tasks.  Status at last assessment: 5 repetitions  4.  Patient will increase LEFS Score to 17/80 to improve tolerance to

## 2025-01-27 NOTE — PROGRESS NOTES
\"Have you been to the ER, urgent care clinic since your last visit?  Hospitalized since your last visit?\"    YES - When: approximately 3 months ago.  Where and Why: [facility]    “Have you seen or consulted any other health care providers outside our system since your last visit?”    NO      “Have you had a diabetic eye exam?”    YES - Where:  Nurse/CMA to request most recent records if not in the chart     No diabetic eye exam on file

## 2025-01-28 NOTE — ASSESSMENT & PLAN NOTE
-Chronic, Uncontrolled.   Last A1c was 8.8 in August 2024.  This was decreased from 9.0 in May 2024.    -He is currently taking Humalog 10 units with meals and Lantus 30 units twice a day.    -On Trulicity 3 mg once weekly dose.   Orders:   REFILL ONETOUCH ULTRA strip; USE A NEW TEST STRIP TO CHECK BLOOD GLUCOSE ONCE A DAY   REFILL insulin lispro (HUMALOG KWIKPEN) 200 UNIT/ML SOPN pen; Inject 10 Units into the skin 3 times daily (with meals)   REFILL TRULICITY 3 MG/0.5ML SOAJ; Inject 3 mg into the skin Once a week at 5 PM    Hemoglobin A1C; Future    Comprehensive Metabolic Panel; Future    Lipid Panel; Future

## 2025-01-28 NOTE — ASSESSMENT & PLAN NOTE
-Recommend a low calorie diet  -Patient encouraged to exercise for 30 minutes 3 to 5 times a week.  -Recommend eating a well balanced healthy diet. (Consistent of lean meats, lots of fruits, vegetables and whole grains).    -Limit processed foods.   -Drink 32 to 64 ounces of fluid each day  -Eat 2 to 3 g of fiber each day  Orders:   START phentermine 37.5 MG capsule; Take 1 capsule by mouth daily for 90 days. Max Daily Amount: 37.5 mg

## 2025-01-28 NOTE — ASSESSMENT & PLAN NOTE
-On Coumadin 10 mg every night except for Wednesday night.    -Takes Coumadin 3 mg on Wednesday night

## 2025-01-28 NOTE — ASSESSMENT & PLAN NOTE
Continue physical therapy Mon and Wednesday.    He is making some progress.    Can stand with parallel bars to help assist with taking steps for walking.

## 2025-01-28 NOTE — ASSESSMENT & PLAN NOTE
Stable on atorvastatin 40 mg a day    Orders:    Comprehensive Metabolic Panel; Future    Lipid Panel; Future

## 2025-01-28 NOTE — ASSESSMENT & PLAN NOTE
Controlled on Amlodipine 10 mg once daily and lisinopril 10 mg once daily.     Patient  Stopped hydralazine 25mg qd d/t patient states he was hallucinating       Orders:   REFILL  amLODIPine (NORVASC) 10 MG tablet; Take 1 tablet by mouth daily   REFILL  lisinopril (PRINIVIL;ZESTRIL) 10 MG tablet; Take 1 tablet by mouth daily    Comprehensive Metabolic Panel; Future    Lipid Panel; Future

## 2025-01-29 ENCOUNTER — HOSPITAL ENCOUNTER (OUTPATIENT)
Facility: HOSPITAL | Age: 50
Setting detail: RECURRING SERIES
Discharge: HOME | End: 2025-02-01
Payer: MEDICAID

## 2025-01-29 PROCEDURE — 97530 THERAPEUTIC ACTIVITIES: CPT

## 2025-01-29 PROCEDURE — 97112 NEUROMUSCULAR REEDUCATION: CPT

## 2025-01-29 PROCEDURE — 97116 GAIT TRAINING THERAPY: CPT

## 2025-01-29 PROCEDURE — 97110 THERAPEUTIC EXERCISES: CPT

## 2025-01-29 NOTE — PROGRESS NOTES
PHYSICAL / OCCUPATIONAL THERAPY - DAILY TREATMENT NOTE (updated )    Patient Name: Temo Churchill    Date: 2025    : 1975  [facility] / Plan: [facility] PLAN CARDINAL CARE / Product Type: *No Product type* /      Patient  verified Yes     Visit #   Current / Total 6 10   Time   In / Out 12:20 1:00   Pain   In / Out 8/10 R knee  8/10 R knee   Subjective Functional Status/Changes: \"I haven't been walking at home yet.\"     TREATMENT AREA =  Gait instability [R26.81]    OBJECTIVE      Therapeutic Procedures:  Tx Min  Procedure, Rationale, Specifics   16  [id number] Therapeutic Exercise (timed):  increase ROM, strength, coordination, balance, and proprioception to improve patient's ability to progress to PLOF and address remaining functional goals. (see flow sheet as applicable)     Details if applicable:  LE strengthening     8  37585 Neuromuscular Re-Education (timed):  improve balance, coordination, kinesthetic sense, posture, core stability and proprioception to improve patient's ability to develop conscious control of individual muscles and awareness of position of extremities in order to progress to PLOF and address remaining functional goals. (see flow sheet as applicable)     Details if applicable:  quad and glute re-ed     8  65749 Therapeutic Activity (timed):  use of dynamic activities replicating functional movements to increase ROM, strength, coordination, balance, and proprioception in order to improve patient's ability to progress to PLOF and address remaining functional goals.  (see flow sheet as applicable)     Details if applicable:  sit to stands, standing in parallel bars      8  02139 Gait Training (timed):    10 feet with WC follow (assistive device) over level surfaces with supervision level of assist. Cuing for upright posture.  To improve safety and dynamic movement with household/community ambulation.  (see flow sheet as applicable)     Details if  TRANSFER - OUT REPORT:    [facility]  [facility] for routine post - op       Report consisted of patients Situation, Background, Assessment and   Recommendations(SBAR). Information from the following report(s) OR Summary was reviewed with the receiving nurse. Lines:   Peripheral IV 08/23/17 Left Forearm (Active)   Site Assessment Clean, dry, & intact 8/23/2017 12:20 PM   Phlebitis Assessment 0 8/23/2017 12:20 PM   Infiltration Assessment 0 8/23/2017 12:20 PM   Dressing Status Clean, dry, & intact 8/23/2017 12:20 PM   Dressing Type Transparent 8/23/2017 12:20 PM   Hub Color/Line Status Green 8/23/2017 12:20 PM        Opportunity for questions and clarification was provided.       Patient transported with:   O2 @ 2 liters

## 2025-02-03 ENCOUNTER — HOSPITAL ENCOUNTER (OUTPATIENT)
Facility: HOSPITAL | Age: 50
Setting detail: RECURRING SERIES
Discharge: HOME | End: 2025-02-06
Payer: MEDICAID

## 2025-02-03 PROCEDURE — 97112 NEUROMUSCULAR REEDUCATION: CPT

## 2025-02-03 PROCEDURE — 97535 SELF CARE MNGMENT TRAINING: CPT

## 2025-02-03 PROCEDURE — 97110 THERAPEUTIC EXERCISES: CPT

## 2025-02-03 NOTE — PROGRESS NOTES
PHYSICAL / OCCUPATIONAL THERAPY - DAILY TREATMENT NOTE (updated )    Patient Name: Temo Churchill    Date: 2/3/2025    : 1975  [facility] / Plan: [facility] PLAN CARDINAL CARE / Product Type: *No Product type* /      Patient  verified Yes     Visit #   Current / Total 7 10   Time   In / Out 1:00 1:35   Pain   In / Out 10/10 R hip 10/10 R hip   Subjective Functional Status/Changes: \"I did a little walking at home but my hip is really painful.\"     TREATMENT AREA =  Gait instability [R26.81]    OBJECTIVE  Modalities Rationale:     decrease pain to improve patient's ability to progress to PLOF and address remaining functional goals.     min [] Estim Unattended, type/location:                                      []  w/ice    []  w/heat    min [] Estim Attended, type/location:                                     []  w/US     []  w/ice    []  w/heat    []  TENS insruct      min []  Mechanical Traction: type/lbs                   []  pro   []  sup   []  int   []  cont    []  before manual    []  after manual    min []  Ultrasound, settings/location:     10 min  unbill []  Ice     [x]  Heat    location/position: Left hip in seated in wheelchair    min []  Paraffin,  details:     min []  Vasopneumatic Device, press/temp:     min []  Whirlpool / Fluido:    If using vaso (only need to measure limb vaso being performed on)      pre-treatment girth :       post-treatment girth :       measured at (landmark location) :      min []  Other:    Skin assessment post-treatment:   Intact      Therapeutic Procedures:  Tx Min  Procedure, Rationale, Specifics   9  43328 Therapeutic Exercise (timed):  increase ROM, strength, coordination, balance, and proprioception to improve patient's ability to progress to PLOF and address remaining functional goals. (see flow sheet as applicable)     Details if applicable:  LE strengthening     8  17628 Neuromuscular Re-Education (timed):  improve

## 2025-02-04 ENCOUNTER — TELEPHONE (OUTPATIENT)
Facility: CLINIC | Age: 50
End: 2025-02-04

## 2025-02-04 DIAGNOSIS — E11.69 TYPE 2 DIABETES MELLITUS WITH OTHER SPECIFIED COMPLICATION, WITHOUT LONG-TERM CURRENT USE OF INSULIN (HCC): Primary | ICD-10-CM

## 2025-02-04 RX ORDER — INSULIN LISPRO 100 [IU]/ML
INJECTION, SOLUTION INTRAVENOUS; SUBCUTANEOUS
Qty: 2 ADJUSTABLE DOSE PRE-FILLED PEN SYRINGE | Refills: 5 | Status: SHIPPED | OUTPATIENT
Start: 2025-02-04

## 2025-02-04 NOTE — TELEPHONE ENCOUNTER
Please return call to patient regarding his   insulin lispro (HUMALOG KWIKPEN) 200 UNIT/ML SOPN pen [[id number]]    Order Details  Dose: 10 Units Route: SubCUTAneous Frequency: 3 TIMES DAILY WITH MEALS   Dispense Quantity: 2 Adjustable Dose Pre-filled Pen Syringe Refills: 5          Sig: Inject 10 Units into the skin 3 times daily (with meals)     He states that the insurance will not cover the 200 unit/ml sopn pen but will cover the 100 unit/ml sopn pen please review chart and send over requested medication to patients pharmacy

## 2025-02-05 ENCOUNTER — HOSPITAL ENCOUNTER (OUTPATIENT)
Facility: HOSPITAL | Age: 50
Setting detail: RECURRING SERIES
Discharge: HOME | End: 2025-02-08
Payer: MEDICAID

## 2025-02-05 PROCEDURE — 97112 NEUROMUSCULAR REEDUCATION: CPT

## 2025-02-05 PROCEDURE — 97530 THERAPEUTIC ACTIVITIES: CPT

## 2025-02-05 PROCEDURE — 97110 THERAPEUTIC EXERCISES: CPT

## 2025-02-05 NOTE — TELEPHONE ENCOUNTER
Orders Placed This Encounter    insulin lispro, 1 Unit Dial, (HUMALOG KWIKPEN) 100 UNIT/ML SOPN     Sig: Inject 10 units into skin 3 times daily (with meals)     Dispense:  2 Adjustable Dose Pre-filled Pen Syringe     Refill:  5     Patient changed from 200 units/mL SOPN  to  100 units/mL SOPN insulin lispro KwikPen

## 2025-02-05 NOTE — PROGRESS NOTES
PHYSICAL / OCCUPATIONAL THERAPY - DAILY TREATMENT NOTE    Patient Name: Temo Churchill    Date: 2025    : 1975  Insurance: Payor: [facility] MEDICAID / Plan: [facility] CARDINAL CARE / Product Type: *No Product type* /      Patient  verified Yes     Visit #   Current / Total 1 10   Time   In / Out 3:00 pm 3:49 pm   Pain   In / Out 9/10 in back of right hip 8/10   Subjective Functional Status/Changes: \"My hip hurts really bad. All the time. It feels better when I lie on my back with my legs up.\"     TREATMENT AREA =  Gait instability [R26.81]     OBJECTIVE  Modalities Rationale:     decrease pain to improve patient's ability to progress to PLOF and address remaining functional goals.    10 min  unbill     [x]  Heat    location/position: Right hip and low back, in left side-lying   Skin assessment post-treatment:   Intact        Therapeutic Procedures:  Tx Min Procedure, Rationale, Specifics   13 [id number] Therapeutic Exercise (timed):  increase ROM, strength, coordination, balance, and proprioception to improve patient's ability to progress to PLOF and address remaining functional goals. (see flow sheet as applicable)     Details if applicable:  strengthening     18 76787 Neuromuscular Re-Education (timed):  improve balance, coordination, kinesthetic sense, posture, core stability and proprioception to improve patient's ability to develop conscious control of individual muscles and awareness of position of extremities in order to progress to PLOF and address remaining functional goals. (see flow sheet as applicable)     Details if applicable:  gluteal re-education, sciatic nerve gliding     8 02225 Therapeutic Activity (timed):  use of dynamic activities replicating functional movements to increase ROM, strength, coordination, balance, and proprioception in order to improve patient's ability to progress to PLOF and address remaining functional goals.  (see flow sheet as applicable)     Details

## 2025-02-12 ENCOUNTER — HOSPITAL ENCOUNTER (OUTPATIENT)
Facility: HOSPITAL | Age: 50
Setting detail: RECURRING SERIES
Discharge: HOME | End: 2025-02-15
Payer: MEDICAID

## 2025-02-12 PROCEDURE — 97530 THERAPEUTIC ACTIVITIES: CPT

## 2025-02-12 PROCEDURE — 97112 NEUROMUSCULAR REEDUCATION: CPT

## 2025-02-12 PROCEDURE — 97116 GAIT TRAINING THERAPY: CPT

## 2025-02-12 PROCEDURE — 97110 THERAPEUTIC EXERCISES: CPT

## 2025-02-12 NOTE — PROGRESS NOTES
[facility] [place]   [address] [place] [address]  Phone: [phone] Fax: [phone]  PROGRESS NOTE  Patient Name: Temo Churchill : 1975   Treatment/Medical Diagnosis: Gait instability [R26.81]   Referral Source: Catalina Beaulieu MD Payor: Payor: [facility] MEDICAID / Plan: [facility] CARDINAL CARE / Product Type: *No Product type* /    Date of Initial Visit: 24 Attended Visits: 12 Missed Visits: 0     SUMMARY OF TREATMENT  Temo Churchill is a 49 y.o.  yo male with Dx of Gait instability [R26.81].  Treatment has consisted of therapeutic exercise, therapeutic activity, neuromuscular re-education, gait training, self care education and physical agent/modality to improve gait instability.    CURRENT STATUS  Patient has attended 12 of 12 scheduled sessions from 24-25 with good progress toward goals. Current assessment indicates good compliance with HEP. Patient able to ambulate short distances up to 10 feet. Recent exacerbation of right hip pain limited weight bearing x 1 week; current symptoms resolved. Patient would benefit from continued skilled PT services to further improve functional deficits and facilitate return to prior level of function.    Subjective:  Pain: Average 5/10 right knee in weightbearing  Reported Functional Deficits: non-ambulatory, unable to stand upright, standing tolerance <30 seconds, difficulty transferring to bedside commode, unable to negotiate steps, unable to squat and lift   Reported Functional Improvements: increased ease of dressing with improved level of independence  Patient Goal: \"Get back to walking\"    Objective:    Range of Motion:  Right Knee Extension AROM: 26 degree lag with pain  Ankle AROM Right: DF 12 deg lag, PF 60 deg                                                       Strength (MMT 1-5):               Hip (seated) Left Right   Hip Flexion 4 4   Hip Ext NT NT 
NT NT   Hip ABD NT NT      Knee (seated) Left Right   Knee Flexion 4 4   Knee Extension 4 4   Ankle PF 4 4   Ankle DF 4 4      30 Second STS: from WC with RW x 3 repetitions      Standin min 55 seconds in parallel bars     Transfers: patient able to transfer from 19 inch wheelchair to RW with supervision assist      Gait: patient able to ambulate 2 x 10 feet with RW contact guard and WC follow    Patient will continue to benefit from skilled PT / OT services to modify and progress therapeutic interventions, analyze and address functional mobility deficits, analyze and address ROM deficits, analyze and address strength deficits, analyze and address soft tissue restrictions, analyze and cue for proper movement patterns, and analyze and modify for postural abnormalities to address functional deficits and attain remaining goals.     Progress toward goals / Updated goals:  [x]  See Progress Note/Recertification  1. Patient will ambulate 2 x 40 feet with RW to improve household ambulation during household chores.  Status at last assessment: 2 x 10 feet with RW contact guard and WC follow  2. Patient will increase standing tolerance to 5 minutes to improve tolerance to household chores including light meal preparation.  Status at last assessment: 1 min 55 seconds in parallel bars  3. Patient will demonstrate 5 repetitions from WC to RW for 30 second STS Test to prepare for squatting and lifting during household tasks.  Status at last assessment: 3 repetitions from WC to RW  4.  Patient will increase LEFS Score to 17/80 to improve tolerance to standing and walking during household tasks.  Status at last assessment: 780     PN Due 3/12/25  Auth 36 visits expires 3/28/25    PLAN  - Continue Plan of Care  - Upgrade activities as tolerated    If an interpreting service was utilized for treatment of this patient, the contents of this document represent the material reviewed with the patient via the .    Christen

## 2025-02-14 ENCOUNTER — HOSPITAL ENCOUNTER (OUTPATIENT)
Facility: HOSPITAL | Age: 50
Setting detail: RECURRING SERIES
Discharge: HOME | End: 2025-02-17
Payer: MEDICAID

## 2025-02-14 PROCEDURE — 97112 NEUROMUSCULAR REEDUCATION: CPT

## 2025-02-14 PROCEDURE — 97116 GAIT TRAINING THERAPY: CPT

## 2025-02-14 NOTE — PROGRESS NOTES
to 20 feet using FWW, indicating improvement in functional LE strength. Unable to turn using FWW due to decreased SLS balance, therefore, initiated weight shifting in parallel bars to promote increased unilateral weight shifting.      Patient will continue to benefit from skilled PT / OT services to modify and progress therapeutic interventions, analyze and address functional mobility deficits, analyze and address ROM deficits, analyze and address strength deficits, analyze and address soft tissue restrictions, analyze and cue for proper movement patterns, and analyze and modify for postural abnormalities to address functional deficits and attain remaining goals.       Progress toward goals / Updated goals:  []  See Progress Note/Recertification  1. Patient will ambulate 2 x 40 feet with RW to improve household ambulation during household chores.  Status at last assessment: 2 x 10 feet with RW contact guard and WC follow  Current: goal progressing; 2 x 20 feet with FWW (2/14/25 - WALLACE, PTA)  2. Patient will increase standing tolerance to 5 minutes to improve tolerance to household chores including light meal preparation.  Status at last assessment: 1 min 55 seconds in parallel bars  3. Patient will demonstrate 5 repetitions from WC to RW for 30 second STS Test to prepare for squatting and lifting during household tasks.  Status at last assessment: 3 repetitions from WC to RW  4.  Patient will increase LEFS Score to 17/80 to improve tolerance to standing and walking during household tasks.  Status at last assessment: 7/80      PN Due 3/12/25  Auth 36 visits expires 3/28/25    PLAN  - Continue Plan of Care  - Upgrade activities as tolerated      If an interpreting service was utilized for treatment of this patient, the contents of this document represent the material reviewed with the patient via the .    Carlos Guevara, PTA    2/14/2025    3:21 PM    Future Appointments   Date Time Provider Department

## 2025-02-17 ENCOUNTER — HOSPITAL ENCOUNTER (OUTPATIENT)
Facility: HOSPITAL | Age: 50
Setting detail: RECURRING SERIES
Discharge: HOME | End: 2025-02-20
Payer: MEDICAID

## 2025-02-17 PROCEDURE — 97530 THERAPEUTIC ACTIVITIES: CPT

## 2025-02-17 PROCEDURE — 97112 NEUROMUSCULAR REEDUCATION: CPT

## 2025-02-17 PROCEDURE — 97116 GAIT TRAINING THERAPY: CPT

## 2025-02-17 PROCEDURE — 97110 THERAPEUTIC EXERCISES: CPT

## 2025-02-17 NOTE — PROGRESS NOTES
PHYSICAL / OCCUPATIONAL THERAPY - DAILY TREATMENT NOTE    Patient Name: Temo Churchill    Date: 2025    : 1975  Insurance: Payor: [facility] MEDICAID / Plan: [facility] CARDINAL CARE / Product Type: *No Product type* /      Patient  verified Yes     Visit #   Current / Total 2 10   Time   In / Out 11:02 11:42   Pain   In / Out 0 0   Subjective Functional Status/Changes: \"I sat in the wheelchair for about 2 hours a day over the weekend.\"     TREATMENT AREA =  Gait instability [R26.81]     OBJECTIVE      Therapeutic Procedures:  Tx Min Procedure, Rationale, Specifics   16 [id number] Therapeutic Exercise (timed):  increase ROM, strength, coordination, balance, and proprioception to improve patient's ability to progress to PLOF and address remaining functional goals. (see flow sheet as applicable)     Details if applicable:  LE strengthening     8 72165 Neuromuscular Re-Education (timed):  improve balance, coordination, kinesthetic sense, posture, core stability and proprioception to improve patient's ability to develop conscious control of individual muscles and awareness of position of extremities in order to progress to PLOF and address remaining functional goals. (see flow sheet as applicable)     Details if applicable:  quad and glute re-ed     8 02949 Therapeutic Activity (timed):  use of dynamic activities replicating functional movements to increase ROM, strength, coordination, balance, and proprioception in order to improve patient's ability to progress to PLOF and address remaining functional goals.  (see flow sheet as applicable)     Details if applicable:  standing, STS     8 00015 Gait Training (timed):    2 x 10 feet with WC follow (assistive device) over level surfaces with supervision level of assist. Cuing for upright posture.  To improve safety and dynamic movement with household/community ambulation.  (see flow sheet as applicable)      Details if applicable:  gait with RW and

## 2025-02-19 ENCOUNTER — APPOINTMENT (OUTPATIENT)
Facility: HOSPITAL | Age: 50
End: 2025-02-19
Payer: MEDICAID

## 2025-02-24 ENCOUNTER — APPOINTMENT (OUTPATIENT)
Facility: HOSPITAL | Age: 50
End: 2025-02-24
Payer: MEDICAID

## 2025-02-26 ENCOUNTER — APPOINTMENT (OUTPATIENT)
Facility: HOSPITAL | Age: 50
End: 2025-02-26
Payer: MEDICAID

## 2025-03-03 ENCOUNTER — HOSPITAL ENCOUNTER (OUTPATIENT)
Facility: HOSPITAL | Age: 50
Setting detail: RECURRING SERIES
Discharge: HOME | End: 2025-03-06
Payer: MEDICAID

## 2025-03-03 PROCEDURE — 97112 NEUROMUSCULAR REEDUCATION: CPT

## 2025-03-03 PROCEDURE — 97530 THERAPEUTIC ACTIVITIES: CPT

## 2025-03-03 PROCEDURE — 97110 THERAPEUTIC EXERCISES: CPT

## 2025-03-03 NOTE — PROGRESS NOTES
walking during household tasks.  Status at last assessment: 7/80     PN Due 3/12/25  Auth 36 visits expires 3/28/25    PLAN  - Continue Plan of Care  - Upgrade activities as tolerated    If an interpreting service was utilized for treatment of this patient, the contents of this document represent the material reviewed with the patient via the .    Dari Valadez, PT    3/3/2025    7:51 AM    Future Appointments   Date Time Provider Department Center   3/3/2025 12:20 PM Dari Valadez, PT [facility]   3/6/2025 11:40 AM [facility] 3 [facility]   3/12/2025 11:40 AM Carlos Guevara, [facility]   3/14/2025 11:40 AM Carlos Guevara, [facility]   3/17/2025 12:20 PM Christen Mars, PT [facility]   3/20/2025 12:20 PM Carlos Guevara PTA [facility]   3/24/2025 12:20 PM Christen Mars, PT [facility]   3/26/2025 11:40 AM Carlos Guevara, PTA [facility]

## 2025-03-06 ENCOUNTER — HOSPITAL ENCOUNTER (OUTPATIENT)
Facility: HOSPITAL | Age: 50
Setting detail: RECURRING SERIES
Discharge: HOME | End: 2025-03-09
Payer: MEDICAID

## 2025-03-06 PROCEDURE — 97110 THERAPEUTIC EXERCISES: CPT

## 2025-03-06 PROCEDURE — 97112 NEUROMUSCULAR REEDUCATION: CPT

## 2025-03-06 PROCEDURE — 97530 THERAPEUTIC ACTIVITIES: CPT

## 2025-03-06 NOTE — PROGRESS NOTES
PHYSICAL / OCCUPATIONAL THERAPY - DAILY TREATMENT NOTE    Patient Name: Temo Churchill    Date: 3/6/2025    : 1975  Insurance: Payor: [facility] MEDICAID / Plan: [facility] CARDINAL CARE / Product Type: *No Product type* /      Patient  verified Yes     Visit #   Current / Total 4 10   Time   In / Out 1120 12pm   Pain   In / Out 0/10 010   Subjective Functional Status/Changes:   Patient open to try stand balance activity with ball toss      TREATMENT AREA =  Gait instability [R26.81]     OBJECTIVE    Therapeutic Procedures:  Tx Min   Billable One on One time    Procedure, Rationale, Specifics   15  28637 Therapeutic Exercise (timed):  increase ROM, strength, coordination, balance, and proprioception to improve patient's ability to progress to PLOF and address remaining functional goals. (see flow sheet as applicable)     Details if applicable:       10           52364 Neuromuscular Re-Education (timed):  improve balance, coordination, kinesthetic sense, posture, core stability and proprioception to improve patient's ability to develop conscious control of individual muscles and awareness of position of extremities in order to progress to PLOF and address remaining functional goals. (see flow sheet as applicable)     Details if applicable:     15      52442 Therapeutic Activity (timed):  use of dynamic activities replicating functional movements to increase ROM, strength, coordination, balance, and proprioception in order to improve patient's ability to progress to PLOF and address remaining functional goals.  (see flow sheet as applicable)     Details if applicable:     40  [facility] Totals Reminder: bill using total billable min of TIMED therapeutic procedures (example: do not include dry needle or estim unattended, both untimed codes, in totals to left)  8-22 min = 1 unit; 23-37 min = 2 units; 38-52 min = 3 units; 53-67 min = 4 units; 68-82 min = 5 units   Total      [x]  Patient Education

## 2025-03-12 ENCOUNTER — HOSPITAL ENCOUNTER (OUTPATIENT)
Facility: HOSPITAL | Age: 50
Setting detail: RECURRING SERIES
Discharge: HOME | End: 2025-03-15
Payer: MEDICAID

## 2025-03-12 PROCEDURE — 97530 THERAPEUTIC ACTIVITIES: CPT

## 2025-03-12 PROCEDURE — 97112 NEUROMUSCULAR REEDUCATION: CPT

## 2025-03-12 PROCEDURE — 97110 THERAPEUTIC EXERCISES: CPT

## 2025-03-12 NOTE — PROGRESS NOTES
PHYSICAL / OCCUPATIONAL THERAPY - DAILY TREATMENT NOTE    Patient Name: Temo Churchill    Date: 3/12/2025    : 1975  Insurance: Payor: [facility] MEDICAID / Plan: [facility] CARDINAL CARE / Product Type: *No Product type* /      Patient  verified Yes     Visit #   Current / Total 5 10   Time   In / Out 1145 1225   Pain   In / Out 0/10 010   Subjective Functional Status/Changes: Patient reports doing well      TREATMENT AREA =  Gait instability [R26.81]     OBJECTIVE    Therapeutic Procedures:  Tx Min   Procedure, Rationale, Specifics   10 71974 Therapeutic Exercise (timed):  increase ROM, strength, coordination, balance, and proprioception to improve patient's ability to progress to PLOF and address remaining functional goals. (see flow sheet as applicable)     Details if applicable:  reassessment      15 90810 Neuromuscular Re-Education (timed):  improve balance, coordination, kinesthetic sense, posture, core stability and proprioception to improve patient's ability to develop conscious control of individual muscles and awareness of position of extremities in order to progress to PLOF and address remaining functional goals. (see flow sheet as applicable)     Details if applicable:     15 34988 Therapeutic Activity (timed):  use of dynamic activities replicating functional movements to increase ROM, strength, coordination, balance, and proprioception in order to improve patient's ability to progress to PLOF and address remaining functional goals.  (see flow sheet as applicable)     Details if applicable:     40 [facility] Totals Reminder: bill using total billable min of TIMED therapeutic procedures (example: do not include dry needle or estim unattended, both untimed codes, in totals to left)  8-22 min = 1 unit; 23-37 min = 2 units; 38-52 min = 3 units; 53-67 min = 4 units; 68-82 min = 5 units   Total     [x]  Patient Education billed concurrently with other procedures   [x] Review HEP    []

## 2025-03-12 NOTE — PROGRESS NOTES
[facility] [place]   [address] [place] [address]  Phone: [phone] Fax: [phone]  PROGRESS NOTE  Patient Name: Temo Churchill : 1975   Treatment/Medical Diagnosis: Gait instability [R26.81]   Referral Source: Catalina Beaulieu MD Payor: Payor: [facility] MEDICAID / Plan: [facility] PLAN CARDINAL CARE / Product Type: *No Product type* /    Date of Initial Visit: 24 Attended Visits: 17 Missed Visits: 0     SUMMARY OF TREATMENT  Temo Churchill is a 49 y.o.  yo male with Dx of Gait instability [R26.81].  Treatment has consisted of therapeutic exercise, therapeutic activity, neuromuscular re-education, gait training, self care education and physical agent/modality to improve gait instability.    CURRENT STATUS  Patient has attended 17 sessions from 24-3/12/2025 and progressing well albeit slow sec to comorbidities.  Assessment as follows:   Patient would benefit from continued skilled PT services to further improve functional deficits and facilitate return to prior level of function.  Pain at best 1, at worst 7 - R knee  Subjective % improvement 50%  Objective:   Gait : @ home with walker 20 ft   In clinic 40 ft x 2   STS : for tansfers for walking and to use restroom, mod I   Standing tolerance 1 min x 1, 2 min x 1  Seated hip strength flexion4+ B   Knee strength flexion 4 B  extension L 4+, R 3-  Improvements: walking and standing tolerance , rolling over in bed, bending , confidence   Deficits walking and standing tolerance        Progress toward goals / Updated goals:  []  See Progress Note/Recertification  1. Patient will ambulate 2 x 40 feet with RW to improve household ambulation during household chores.  Status at last assessment: 2 x 10 feet with RW contact guard and WC follow  Current: met at 40 ft x 2 with rest break between     2. Patient will increase standing tolerance to 5 minutes to improve tolerance

## 2025-03-14 ENCOUNTER — HOSPITAL ENCOUNTER (OUTPATIENT)
Facility: HOSPITAL | Age: 50
Setting detail: RECURRING SERIES
Discharge: HOME | End: 2025-03-17
Payer: MEDICAID

## 2025-03-14 PROCEDURE — 97110 THERAPEUTIC EXERCISES: CPT

## 2025-03-14 PROCEDURE — 97530 THERAPEUTIC ACTIVITIES: CPT

## 2025-03-14 PROCEDURE — 97112 NEUROMUSCULAR REEDUCATION: CPT

## 2025-03-14 NOTE — PROGRESS NOTES
PHYSICAL / OCCUPATIONAL THERAPY - DAILY TREATMENT NOTE    Patient Name: Temo Churchill    Date: 3/14/2025    : 1975  Insurance: Payor: [facility] MEDICAID / Plan: [facility] CARDINAL CARE / Product Type: *No Product type* /      Patient  verified Yes     Visit #   Current / Total 1 10   Time   In / Out 11:40 am 12:33 pm   Pain   In / Out 7/10 in right knee 0/10   Subjective Functional Status/Changes: \"It's doing okay.\" (*knee*)     TREATMENT AREA =  Gait instability     OBJECTIVE  Modalities Rationale:     decrease edema, decrease inflammation, and decrease pain to improve patient's ability to progress to PLOF and address remaining functional goals.    10 min  unbill [x]  Ice     location/position: Seated to (R) knee   Skin assessment post-treatment:   Intact        Therapeutic Procedures:  Tx Min Billable or 1:1 Min (if diff from Tx Min) Procedure, Rationale, Specifics   14 11 39974 Therapeutic Exercise (timed):  increase ROM, strength, coordination, balance, and proprioception to improve patient's ability to progress to PLOF and address remaining functional goals. (see flow sheet as applicable)     Details if applicable:        59484 Neuromuscular Re-Education (timed):  improve balance, coordination, kinesthetic sense, posture, core stability and proprioception to improve patient's ability to develop conscious control of individual muscles and awareness of position of extremities in order to progress to PLOF and address remaining functional goals. (see flow sheet as applicable)     Details if applicable:       15 15 39736 Therapeutic Activity (timed):  use of dynamic activities replicating functional movements to increase ROM, strength, coordination, balance, and proprioception in order to improve patient's ability to progress to PLOF and address remaining functional goals.  (see flow sheet as applicable)     Details if applicable:       43 40 [facility] Totals Reminder: bill using total

## 2025-03-20 ENCOUNTER — HOSPITAL ENCOUNTER (OUTPATIENT)
Facility: HOSPITAL | Age: 50
Setting detail: RECURRING SERIES
Discharge: HOME | End: 2025-03-23
Payer: MEDICAID

## 2025-03-20 PROCEDURE — 97110 THERAPEUTIC EXERCISES: CPT

## 2025-03-20 PROCEDURE — 97112 NEUROMUSCULAR REEDUCATION: CPT

## 2025-03-20 PROCEDURE — 97116 GAIT TRAINING THERAPY: CPT

## 2025-03-20 NOTE — PROGRESS NOTES
PHYSICAL / OCCUPATIONAL THERAPY - DAILY TREATMENT NOTE    Patient Name: Temo Churchill    Date: 3/20/2025    : 1975  Insurance: Payor: [facility] MEDICAID / Plan: [facility] PLAN CARDINAL CARE / Product Type: *No Product type* /      Patient  verified Yes     Visit #   Current / Total 2 10   Time   In / Out 12:20 pm 1:10 pm   Pain   In / Out 7/10 in right knee 0/10   Subjective Functional Status/Changes: \"I have been walking in the house more.\"     TREATMENT AREA =  Gait instability     OBJECTIVE  Modalities Rationale:     decrease edema, decrease inflammation, and decrease pain to improve patient's ability to progress to PLOF and address remaining functional goals.    10 min  unbill [x]  Heat    location/position: Seated to (R) knee with right foot supported by six inch step   Skin assessment post-treatment:   Intact        Therapeutic Procedures:  Tx Min Billable or 1:1 Min (if diff from Tx Min) Procedure, Rationale, Specifics   12 12 17399 Therapeutic Exercise (timed):  increase ROM, strength, coordination, balance, and proprioception to improve patient's ability to progress to PLOF and address remaining functional goals. (see flow sheet as applicable)     Details if applicable:       16 16 04027 Neuromuscular Re-Education (timed):  improve balance, coordination, kinesthetic sense, posture, core stability and proprioception to improve patient's ability to develop conscious control of individual muscles and awareness of position of extremities in order to progress to PLOF and address remaining functional goals. (see flow sheet as applicable)     Details if applicable:        62218 Gait Training (timed):    27 feet, 40  feet with FWW (assistive device) over level surfaces with SBA level of assist. Cuing for heel to toe patterning.  To improve safety and dynamic movement with household/community ambulation.  (see flow sheet as applicable)     Details if applicable:       40 40 [facility] Totals

## 2025-03-24 ENCOUNTER — HOSPITAL ENCOUNTER (OUTPATIENT)
Facility: HOSPITAL | Age: 50
Setting detail: RECURRING SERIES
Discharge: HOME | End: 2025-03-27
Payer: MEDICAID

## 2025-03-24 PROCEDURE — 97116 GAIT TRAINING THERAPY: CPT

## 2025-03-24 PROCEDURE — 97110 THERAPEUTIC EXERCISES: CPT

## 2025-03-24 PROCEDURE — 97112 NEUROMUSCULAR REEDUCATION: CPT

## 2025-03-24 PROCEDURE — 97530 THERAPEUTIC ACTIVITIES: CPT

## 2025-03-24 NOTE — PROGRESS NOTES
PHYSICAL / OCCUPATIONAL THERAPY - DAILY TREATMENT NOTE    Patient Name: Temo Churchill    Date: 3/24/2025    : 1975  Insurance: [facility]: [facility] MEDICAID / Plan: [facility] PLAN CARDINAL CARE / Product Type: *No Product type* /      Patient  verified Yes     Visit #   Current / Total 3 10   Time   In / Out 12:20 12:59   Pain   In / Out 7/10 in right knee 0/10   Subjective Functional Status/Changes: \"I did get to walk at home this weekend because my transportation left my walker at PT.\"     TREATMENT AREA =  Gait instability     OBJECTIVE  Modalities Rationale:     decrease edema, decrease inflammation, and decrease pain to improve patient's ability to progress to PLOF and address remaining functional goals.    x min  unbill [x]  Heat    location/position: Seated to (R) knee with right foot supported by six inch step   Skin assessment post-treatment:   Intact        Therapeutic Procedures:  Tx Min Billable or 1:1 Min (if diff from Tx Min) Procedure, Rationale, Specifics   13 13 36961 Therapeutic Exercise (timed):  increase ROM, strength, coordination, balance, and proprioception to improve patient's ability to progress to PLOF and address remaining functional goals. (see flow sheet as applicable)     Details if applicable:  LE strengthening     10 10 03867 Neuromuscular Re-Education (timed):  improve balance, coordination, kinesthetic sense, posture, core stability and proprioception to improve patient's ability to develop conscious control of individual muscles and awareness of position of extremities in order to progress to PLOF and address remaining functional goals. (see flow sheet as applicable)     Details if applicable:  quad and glute re-ed     8 8 00132 Therapeutic Activity (timed):  use of dynamic activities replicating functional movements to increase ROM, strength, coordination, balance, and proprioception in order to improve patient's ability to progress to PLOF and address remaining

## 2025-03-26 ENCOUNTER — HOSPITAL ENCOUNTER (OUTPATIENT)
Facility: HOSPITAL | Age: 50
Setting detail: RECURRING SERIES
Discharge: HOME | End: 2025-03-29
Payer: MEDICAID

## 2025-03-26 PROCEDURE — 97116 GAIT TRAINING THERAPY: CPT

## 2025-03-26 PROCEDURE — 97530 THERAPEUTIC ACTIVITIES: CPT

## 2025-03-26 PROCEDURE — 97110 THERAPEUTIC EXERCISES: CPT

## 2025-03-26 NOTE — PROGRESS NOTES
PHYSICAL / OCCUPATIONAL THERAPY - DAILY TREATMENT NOTE    Patient Name: Temo Churchill    Date: 3/26/2025    : 1975  Insurance: Payor: [facility] MEDICAID / Plan: [facility] PLAN CARDINAL CARE / Product Type: *No Product type* /      Patient  verified Yes     Visit #   Current / Total 4 10   Time   In / Out 11:40 am 12:22 pm   Pain   In / Out 6/10 in right knee 0/10   Subjective Functional Status/Changes: \"I have been walking more at home with my nurse. It's my knee really. I have arthritis. I am going to see the doctor for it.\"     TREATMENT AREA =  Gait instability     OBJECTIVE    Therapeutic Procedures:  Tx Min Procedure, Rationale, Specifics   24 86124 Therapeutic Exercise (timed):  increase ROM, strength, coordination, balance, and proprioception to improve patient's ability to progress to PLOF and address remaining functional goals. (see flow sheet as applicable)     Details if applicable:  strengthening     12 92113 Therapeutic Activity (timed):  use of dynamic activities replicating functional movements to increase ROM, strength, coordination, balance, and proprioception in order to improve patient's ability to progress to PLOF and address remaining functional goals.  (see flow sheet as applicable)     Details if applicable:  static standing, foam step ups     8 26995 Gait Training (timed):    2 x 46 feet  feet with FWW (assistive device) over level surfaces with SBA level of assist. Cuing for heel to toe patterning.  To improve safety and dynamic movement with household/community ambulation.  (see flow sheet as applicable)      Details if applicable:       44 [facility] Totals Reminder: bill using total billable min of TIMED therapeutic procedures (example: do not include dry needle or estim unattended, both untimed codes, in totals to left)  8-22 min = 1 unit; 23-37 min = 2 units; 38-52 min = 3 units; 53-67 min = 4 units; 68-82 min = 5 units   Total     [x]  Patient Education billed

## 2025-04-02 ENCOUNTER — APPOINTMENT (OUTPATIENT)
Facility: HOSPITAL | Age: 50
End: 2025-04-02
Payer: MEDICAID

## 2025-04-04 ENCOUNTER — HOSPITAL ENCOUNTER (OUTPATIENT)
Facility: HOSPITAL | Age: 50
Setting detail: RECURRING SERIES
Discharge: HOME | End: 2025-04-07
Payer: MEDICAID

## 2025-04-04 PROCEDURE — 97110 THERAPEUTIC EXERCISES: CPT

## 2025-04-04 PROCEDURE — 97116 GAIT TRAINING THERAPY: CPT

## 2025-04-04 PROCEDURE — 97112 NEUROMUSCULAR REEDUCATION: CPT

## 2025-04-04 PROCEDURE — 97530 THERAPEUTIC ACTIVITIES: CPT

## 2025-04-04 NOTE — PROGRESS NOTES
PHYSICAL / OCCUPATIONAL THERAPY - DAILY TREATMENT NOTE    Patient Name: Temo Churchill    Date: 2025    : 1975  Insurance: Payor: [facility] MEDICAID / Plan: [facility] CARDINAL CARE / Product Type: *No Product type* /      Patient  verified Yes     Visit #   Current / Total 5 10   Time   In / Out 11:00 11:38   Pain   In / Out 10 in right knee 0/10   Subjective Functional Status/Changes: \"I've been doing more standing and walking.\"     TREATMENT AREA =  Gait instability     OBJECTIVE    Therapeutic Procedures:  Tx Min Procedure, Rationale, Specifics   12 92135 Therapeutic Exercise (timed):  increase ROM, strength, coordination, balance, and proprioception to improve patient's ability to progress to PLOF and address remaining functional goals. (see flow sheet as applicable)     Details if applicable:  LE strengthening     10 52154 Neuromuscular Re-Education (timed):  improve balance, coordination, kinesthetic sense, posture, core stability and proprioception to improve patient's ability to develop conscious control of individual muscles and awareness of position of extremities in order to progress to PLOF and address remaining functional goals. (see flow sheet as applicable)      Details if applicable:  quad and glute re-ed     8 31234 Therapeutic Activity (timed):  use of dynamic activities replicating functional movements to increase ROM, strength, coordination, balance, and proprioception in order to improve patient's ability to progress to PLOF and address remaining functional goals.  (see flow sheet as applicable)     Details if applicable:  static standing, foam step ups     8 65326 Gait Training (timed):    2 x 46 feet  feet with FWW (assistive device) over level surfaces with SBA level of assist. Cuing for heel to toe patterning.  To improve safety and dynamic movement with household/community ambulation.  (see flow sheet as applicable)      Details if applicable:  2 x 70 feet RW

## 2025-04-08 ENCOUNTER — HOSPITAL ENCOUNTER (OUTPATIENT)
Facility: HOSPITAL | Age: 50
Setting detail: RECURRING SERIES
Discharge: HOME | End: 2025-04-11
Payer: MEDICAID

## 2025-04-08 PROCEDURE — 97530 THERAPEUTIC ACTIVITIES: CPT

## 2025-04-08 PROCEDURE — 97110 THERAPEUTIC EXERCISES: CPT

## 2025-04-08 PROCEDURE — 97112 NEUROMUSCULAR REEDUCATION: CPT

## 2025-04-08 NOTE — PROGRESS NOTES
PHYSICAL / OCCUPATIONAL THERAPY - DAILY TREATMENT NOTE    Patient Name: Temo Churchill    Date: 2025    : 1975  Insurance: Payor: [facility] MEDICAID / Plan: [facility][1] PLAN CARDINAL CARE / Product Type: *No Product type* /      Patient  verified Yes     Visit #   Current / Total 6 10   Time   In / Out 11:40 am 12:20 pm   Pain   In / Out 7/10 7/10   Subjective Functional Status/Changes: \"I can walk between rooms at home. I scheduled an appointment with my doctor for my knee. They gave me a cortisone shot before so maybe I'll get one again.\"     TREATMENT AREA =  Gait instability     OBJECTIVE  Therapeutic Procedures:  Tx Min Billable or 1:1 Min (if diff from Tx Min) Procedure, Rationale, Specifics   12  98634 Therapeutic Exercise (timed):  increase ROM, strength, coordination, balance, and proprioception to improve patient's ability to progress to PLOF and address remaining functional goals. (see flow sheet as applicable)     Details if applicable:        64994 Neuromuscular Re-Education (timed):  improve balance, coordination, kinesthetic sense, posture, core stability and proprioception to improve patient's ability to develop conscious control of individual muscles and awareness of position of extremities in order to progress to PLOF and address remaining functional goals. (see flow sheet as applicable)     Details if applicable:        11072 Therapeutic Activity (timed):  use of dynamic activities replicating functional movements to increase ROM, strength, coordination, balance, and proprioception in order to improve patient's ability to progress to PLOF and address remaining functional goals.  (see flow sheet as applicable)     Details if applicable:     40 40 [facility] Totals Reminder: bill using total billable min of TIMED therapeutic procedures (example: do not include dry needle or estim unattended, both untimed codes, in totals to left)  8-22 min = 1 unit; 23-37 min = 2 units;

## 2025-04-11 ENCOUNTER — HOSPITAL ENCOUNTER (OUTPATIENT)
Facility: HOSPITAL | Age: 50
Setting detail: RECURRING SERIES
Discharge: HOME | End: 2025-04-14
Payer: MEDICAID

## 2025-04-11 PROCEDURE — 97110 THERAPEUTIC EXERCISES: CPT

## 2025-04-11 PROCEDURE — 97530 THERAPEUTIC ACTIVITIES: CPT

## 2025-04-11 PROCEDURE — 97112 NEUROMUSCULAR REEDUCATION: CPT

## 2025-04-11 PROCEDURE — 97116 GAIT TRAINING THERAPY: CPT

## 2025-04-11 NOTE — PROGRESS NOTES
[facility] [place]   [address] [place] [address]  Phone: [phone] Fax: [phone]  PROGRESS NOTE  Patient Name: Temo Churchill : 1975   Treatment/Medical Diagnosis: Gait instability [R26.81]   Referral Source: Catalina Beaulieu MD Payor: Payor: [facility] MEDICAID / Plan: [facility] CARDINAL CARE / Product Type: *No Product type* /    Date of Initial Visit: 24 Attended Visits: 24 Missed Visits: 2     SUMMARY OF TREATMENT  Temo Churchill is a 49 y.o.  yo male with Dx of Gait instability [R26.81].  Treatment has consisted of therapeutic exercise, therapeutic activity, neuromuscular re-education, gait training, self care education and physical agent/modality to improve gait instability.    CURRENT STATUS  Patient has attended 24 of 26 scheduled sessions from 24-25 with good progress toward goals. Current assessment indicates continued improvement in functional mobility. Patient reporting good compliance with daily WC use out of bed x 5 hours and standing/walking 2-4x daily.  Continuing to improve overall endurance, however still limited. Patient to follow up with MD on 25 regarding right knee pain causing limitations in standing and walking. Patient would benefit from continued skilled PT services to further improve functional deficits and facilitate return to prior level of function.    Subjective:  Pain: Average 7/10 right knee in weightbearing  Reported Functional Deficits: right knee pain most limiting and decreased endurance  Reported Functional Improvements: walking at home 2x daily household distance, increased standing at home 4x daily, increased seated in WC x 5 hours  Patient Goal: \"More walking\"    Objective:    Range of Motion:  Right Knee Extension AROM: 12 degree lag with pain                                                      Strength (MMT 1-5):               Hip (seated) Left Right

## 2025-04-11 NOTE — PROGRESS NOTES
PHYSICAL / OCCUPATIONAL THERAPY - DAILY TREATMENT NOTE    Patient Name: Temo Churchill    Date: 2025    : 1975  Insurance: Payor: [facility] MEDICAID / Plan: [facility] PLAN CARDINAL CARE / Product Type: *No Product type* /      Patient  verified Yes     Visit #   Current / Total 7 10   Time   In / Out 11:40 12:20   Pain   In / Out 7/10 in right knee 7/10   Subjective Functional Status/Changes: Pain:Average 7/10 right knee in weightbearing  Reported Functional Deficits: right knee pain most limiting and decreased endurance  Reported Functional Improvements: walking at home 2x daily household distance, increased standing at home 4x daily, increased seated in  x 5 hours  Patient Goal: \"More walking\"     TREATMENT AREA =  Gait instability     OBJECTIVE    Therapeutic Procedures:  Tx Min Procedure, Rationale, Specifics   14 45248 Therapeutic Exercise (timed):  increase ROM, strength, coordination, balance, and proprioception to improve patient's ability to progress to PLOF and address remaining functional goals. (see flow sheet as applicable)     Details if applicable:  LE strengthening; reassess     10 59013 Neuromuscular Re-Education (timed):  improve balance, coordination, kinesthetic sense, posture, core stability and proprioception to improve patient's ability to develop conscious control of individual muscles and awareness of position of extremities in order to progress to PLOF and address remaining functional goals. (see flow sheet as applicable)      Details if applicable:  quad and glute re-ed     8 76168 Therapeutic Activity (timed):  use of dynamic activities replicating functional movements to increase ROM, strength, coordination, balance, and proprioception in order to improve patient's ability to progress to PLOF and address remaining functional goals.  (see flow sheet as applicable)     Details if applicable:  static standing, foam step ups, STS x 5     8 53512 Gait Training

## 2025-04-14 ENCOUNTER — APPOINTMENT (OUTPATIENT)
Facility: HOSPITAL | Age: 50
End: 2025-04-14
Payer: MEDICAID

## 2025-04-15 ENCOUNTER — HOSPITAL ENCOUNTER (OUTPATIENT)
Facility: HOSPITAL | Age: 50
Setting detail: RECURRING SERIES
Discharge: HOME | End: 2025-04-18
Payer: MEDICAID

## 2025-04-15 PROCEDURE — 97530 THERAPEUTIC ACTIVITIES: CPT

## 2025-04-15 PROCEDURE — 97535 SELF CARE MNGMENT TRAINING: CPT

## 2025-04-15 PROCEDURE — 97110 THERAPEUTIC EXERCISES: CPT

## 2025-04-15 PROCEDURE — 97112 NEUROMUSCULAR REEDUCATION: CPT

## 2025-04-15 NOTE — PROGRESS NOTES
increase ROM, strength, coordination, balance, and proprioception in order to improve patient's ability to progress to PLOF and address remaining functional goals.  (see flow sheet as applicable)     Details if applicable: exercises, ambulation with RW 1x25 feet from the area of the plinth to the parallel bars and 1x70 feet from the parallel bars to the door of the gym/waiting room.      54 [facility] Totals Reminder: bill using total billable min of TIMED therapeutic procedures (example: do not include dry needle or estim unattended, both untimed codes, in totals to left)  8-22 min = 1 unit; 23-37 min = 2 units; 38-52 min = 3 units; 53-67 min = 4 units; 68-82 min = 5 units   Total     [x]  Patient Education billed concurrently with other procedures   [x] Review HEP    [] Progressed/Changed HEP, detail:    [] Other detail:       Objective Information/Functional Measures/Assessment  Sitting rest breaks taken after each standing exercise due to right knee pain and fatigue/endurance limitations. Added exercises per flow sheet. Decreased stance time noted on the right LE with gait due to right knee pain. Continue POC as tolerated to improve pain and activity tolerance.     Patient will continue to benefit from skilled PT / OT services to modify and progress therapeutic interventions, analyze and address functional mobility deficits, analyze and address ROM deficits, analyze and address strength deficits, analyze and address soft tissue restrictions, analyze and cue for proper movement patterns, and analyze and modify for postural abnormalities to address functional deficits and attain remaining goals.      Progress toward goals / Updated goals:  [x]  See Progress Note/Recertification  New Goals to be achieved in __5__ WEEKS  1. Patient will ambulate 3 x 70 feet with RW to improve household ambulation during household chores.  Status at last assessment: 2 x 70 feet with RW contact guard and WC follow  2. Patient will increase

## 2025-04-18 ENCOUNTER — HOSPITAL ENCOUNTER (OUTPATIENT)
Facility: HOSPITAL | Age: 50
Setting detail: RECURRING SERIES
Discharge: HOME | End: 2025-04-21
Payer: MEDICAID

## 2025-04-18 PROCEDURE — 97530 THERAPEUTIC ACTIVITIES: CPT

## 2025-04-18 PROCEDURE — 97112 NEUROMUSCULAR REEDUCATION: CPT

## 2025-04-18 PROCEDURE — 97110 THERAPEUTIC EXERCISES: CPT

## 2025-04-18 PROCEDURE — 97116 GAIT TRAINING THERAPY: CPT

## 2025-04-18 NOTE — PROGRESS NOTES
PHYSICAL / OCCUPATIONAL THERAPY - DAILY TREATMENT NOTE    Patient Name: Temo Churchill    Date: 2025    : 1975  Insurance: Payor: [facility] MEDICAID / Plan: [facility] CARDINAL CARE / Product Type: *No Product type* /      Patient  verified Yes     Visit #   Current / Total 2 10   Time   In / Out 12:20 pm 1:00 pm   Pain   In / Out 7/10 right knee 7/10 right knee   Subjective Functional Status/Changes: Patient notes pending appointment with his MD regarding his right knee.     TREATMENT AREA =  Gait instability     OBJECTIVE  Therapeutic Procedures:  Tx Min Procedure, Rationale, Specifics   12 85724 Therapeutic Exercise (timed):  increase ROM, strength, coordination, balance, and proprioception to improve patient's ability to progress to PLOF and address remaining functional goals. (see flow sheet as applicable)     Details if applicable:  strengthening     9 31635 Neuromuscular Re-Education (timed):  improve balance, coordination, kinesthetic sense, posture, core stability and proprioception to improve patient's ability to develop conscious control of individual muscles and awareness of position of extremities in order to progress to PLOF and address remaining functional goals. (see flow sheet as applicable)     Details if applicable:  oblique sling patterns     11 36391 Therapeutic Activity (timed):  use of dynamic activities replicating functional movements to increase ROM, strength, coordination, balance, and proprioception in order to improve patient's ability to progress to PLOF and address remaining functional goals.  (see flow sheet as applicable)     Details if applicable:  added step ups     8 00856 Gait Training (timed):    70 feet then 90 feet with FWW (assistive device) over level surfaces with SBA level of assist. Cuing for maintenance of upright posture.  To improve safety and dynamic movement with household/community ambulation.  (see flow sheet as applicable)     Details

## 2025-04-21 ENCOUNTER — HOSPITAL ENCOUNTER (OUTPATIENT)
Facility: HOSPITAL | Age: 50
Setting detail: RECURRING SERIES
Discharge: HOME | End: 2025-04-24
Payer: MEDICAID

## 2025-04-21 PROCEDURE — 97112 NEUROMUSCULAR REEDUCATION: CPT

## 2025-04-21 PROCEDURE — 97110 THERAPEUTIC EXERCISES: CPT

## 2025-04-21 PROCEDURE — 97116 GAIT TRAINING THERAPY: CPT

## 2025-04-21 PROCEDURE — 97530 THERAPEUTIC ACTIVITIES: CPT

## 2025-04-21 NOTE — PROGRESS NOTES
PHYSICAL / OCCUPATIONAL THERAPY - DAILY TREATMENT NOTE    Patient Name: Temo Churchill    Date: 2025    : 1975  Insurance: Payor: [facility] MEDICAID / Plan: [facility] PLAN CARDINAL CARE / Product Type: *No Product type* /      Patient  verified Yes     Visit #   Current / Total 3 10   Time   In / Out 11:40 12:20   Pain   In / Out 7/10 right knee 7/10 right knee   Subjective Functional Status/Changes: \"I've been doing more walking and time in the wheelchair at home.\"     TREATMENT AREA =  Gait instability     OBJECTIVE  Therapeutic Procedures:  Tx Min Procedure, Rationale, Specifics   14 11995 Therapeutic Exercise (timed):  increase ROM, strength, coordination, balance, and proprioception to improve patient's ability to progress to PLOF and address remaining functional goals. (see flow sheet as applicable)     Details if applicable:  LE strengthening     10 63796 Neuromuscular Re-Education (timed):  improve balance, coordination, kinesthetic sense, posture, core stability and proprioception to improve patient's ability to develop conscious control of individual muscles and awareness of position of extremities in order to progress to PLOF and address remaining functional goals. (see flow sheet as applicable)     Details if applicable:  quad and glute re-ed, scapular re-ed     8 88750 Therapeutic Activity (timed):  use of dynamic activities replicating functional movements to increase ROM, strength, coordination, balance, and proprioception in order to improve patient's ability to progress to PLOF and address remaining functional goals.  (see flow sheet as applicable)     Details if applicable:  STS, step ups     8 37878 Gait Training (timed):    120 x 1 , 75 x 1  feet with RW (assistive device) over level surfaces with SBA level of assist. Cuing for maintenance of upright posture.  To improve safety and dynamic movement with household/community ambulation.  (see flow sheet as applicable)

## 2025-04-23 ENCOUNTER — HOSPITAL ENCOUNTER (OUTPATIENT)
Facility: HOSPITAL | Age: 50
Setting detail: SPECIMEN
Discharge: HOME | End: 2025-04-26

## 2025-04-23 ENCOUNTER — OFFICE VISIT (OUTPATIENT)
Facility: CLINIC | Age: 50
End: 2025-04-23

## 2025-04-23 ENCOUNTER — HOSPITAL ENCOUNTER (OUTPATIENT)
Facility: HOSPITAL | Age: 50
Setting detail: RECURRING SERIES
Discharge: HOME | End: 2025-04-26
Payer: MEDICAID

## 2025-04-23 VITALS
OXYGEN SATURATION: 98 % | BODY MASS INDEX: 42.66 KG/M2 | HEART RATE: 79 BPM | TEMPERATURE: 97.2 F | RESPIRATION RATE: 16 BRPM | DIASTOLIC BLOOD PRESSURE: 95 MMHG | SYSTOLIC BLOOD PRESSURE: 145 MMHG | HEIGHT: 72 IN | WEIGHT: 315 LBS

## 2025-04-23 DIAGNOSIS — I26.99 PULMONARY EMBOLISM AND INFARCTION (HCC): ICD-10-CM

## 2025-04-23 DIAGNOSIS — Z11.4 ENCOUNTER FOR SCREENING FOR HIV: ICD-10-CM

## 2025-04-23 DIAGNOSIS — E66.01 MORBID OBESITY WITH BMI OF 60.0-69.9, ADULT (HCC): ICD-10-CM

## 2025-04-23 DIAGNOSIS — Z11.59 NEED FOR HEPATITIS C SCREENING TEST: ICD-10-CM

## 2025-04-23 DIAGNOSIS — I10 ESSENTIAL HYPERTENSION: ICD-10-CM

## 2025-04-23 DIAGNOSIS — R53.1 GENERALIZED WEAKNESS: ICD-10-CM

## 2025-04-23 DIAGNOSIS — M25.561 CHRONIC PAIN OF RIGHT KNEE: ICD-10-CM

## 2025-04-23 DIAGNOSIS — G89.29 CHRONIC PAIN OF RIGHT KNEE: ICD-10-CM

## 2025-04-23 DIAGNOSIS — N50.82 SCROTAL PAIN: ICD-10-CM

## 2025-04-23 DIAGNOSIS — E11.69 TYPE 2 DIABETES MELLITUS WITH OTHER SPECIFIED COMPLICATION, WITHOUT LONG-TERM CURRENT USE OF INSULIN (HCC): Primary | ICD-10-CM

## 2025-04-23 DIAGNOSIS — E78.5 HYPERLIPIDEMIA, UNSPECIFIED HYPERLIPIDEMIA TYPE: ICD-10-CM

## 2025-04-23 DIAGNOSIS — Z79.01 CURRENT USE OF LONG TERM ANTICOAGULATION: ICD-10-CM

## 2025-04-23 LAB
POC INR: 2.4
PROTHROMBIN TIME, POC: ABNORMAL

## 2025-04-23 PROCEDURE — 99001 SPECIMEN HANDLING PT-LAB: CPT

## 2025-04-23 PROCEDURE — 97530 THERAPEUTIC ACTIVITIES: CPT

## 2025-04-23 PROCEDURE — 97116 GAIT TRAINING THERAPY: CPT

## 2025-04-23 PROCEDURE — 97110 THERAPEUTIC EXERCISES: CPT

## 2025-04-23 PROCEDURE — 97112 NEUROMUSCULAR REEDUCATION: CPT

## 2025-04-23 RX ORDER — AMLODIPINE BESYLATE 10 MG/1
10 TABLET ORAL DAILY
Qty: 90 TABLET | Refills: 3 | Status: SHIPPED | OUTPATIENT
Start: 2025-04-23

## 2025-04-23 RX ORDER — AMLODIPINE BESYLATE 10 MG/1
10 TABLET ORAL DAILY
Qty: 90 TABLET | Refills: 0 | Status: SHIPPED | OUTPATIENT
Start: 2025-04-23 | End: 2025-04-23 | Stop reason: SDUPTHER

## 2025-04-23 SDOH — ECONOMIC STABILITY: FOOD INSECURITY: WITHIN THE PAST 12 MONTHS, THE FOOD YOU BOUGHT JUST DIDN'T LAST AND YOU DIDN'T HAVE MONEY TO GET MORE.: NEVER TRUE

## 2025-04-23 SDOH — ECONOMIC STABILITY: FOOD INSECURITY: WITHIN THE PAST 12 MONTHS, YOU WORRIED THAT YOUR FOOD WOULD RUN OUT BEFORE YOU GOT MONEY TO BUY MORE.: NEVER TRUE

## 2025-04-23 ASSESSMENT — ENCOUNTER SYMPTOMS: SHORTNESS OF BREATH: 0

## 2025-04-23 ASSESSMENT — PATIENT HEALTH QUESTIONNAIRE - PHQ9
SUM OF ALL RESPONSES TO PHQ QUESTIONS 1-9: 0
1. LITTLE INTEREST OR PLEASURE IN DOING THINGS: NOT AT ALL
2. FEELING DOWN, DEPRESSED OR HOPELESS: NOT AT ALL
SUM OF ALL RESPONSES TO PHQ QUESTIONS 1-9: 0

## 2025-04-23 NOTE — PROGRESS NOTES
Temo Churchill (:  1975) is a 49 y.o. male,Established patient, here for evaluation of the following chief complaint(s):  Hypertension, Coagulation Disorder, and Weight Management         Assessment & Plan  Essential hypertension  -Uncontrolled on Amlodipine 10 mg once daily and lisinopril 10 mg once daily.    Patient is experiencing some right knee pain and scrotal discomfort.  Orders:    Lipid Panel; Future    Albumin/Creatinine Ratio, Urine; Future    Comprehensive Metabolic Panel; Future    amLODIPine (NORVASC) 10 MG tablet; Take 1 tablet by mouth daily    Type 2 diabetes mellitus with other specified complication, without long-term current use of insulin ([facility])  -Chronic, Uncontrolled.   Last A1c was 8.8 in 2024.  This was decreased from 9.0 in May 2024.    -He is currently taking Humalog 10 units with meals and Lantus 30 units twice a day.    -On Trulicity 3 mg once weekly dose.  -Recheck A1c level.  Pending results may increase Trulicity to 4.5 mg once weekly dose    Orders:    Hemoglobin A1C; Future    Lipid Panel; Future    Albumin/Creatinine Ratio, Urine; Future    Comprehensive Metabolic Panel; Future    External Referral To Podiatry    External Referral To Endocrinology    Pulmonary embolism and infarction (HCC)  -On Coumadin 10 mg a day  Orders:    AMB POC PT/INR    Protime-INR; Future    Current use of long term anticoagulation  -On coumadin  10 mg a day  Recheck PT/INR in 1 month.  Orders:    AMB POC PT/INR    Protime-INR; Future    Hyperlipidemia, unspecified hyperlipidemia type  -Stable on atorvastatin 40 mg a day   Orders:    Lipid Panel; Future    Comprehensive Metabolic Panel; Future    External Referral To Endocrinology    Generalized weakness          Morbid obesity with BMI of 60.0-69.9, adult (HCC)  -Patient on Trulicity 3 mg once weekly  -Has gained 3 pounds since his last office visit in 2025.  Patient's ability to exercise is very limited.  Patient is  using a

## 2025-04-23 NOTE — ASSESSMENT & PLAN NOTE
-Stable on atorvastatin 40 mg a day   Orders:    Lipid Panel; Future    Comprehensive Metabolic Panel; Future    External Referral To Endocrinology

## 2025-04-23 NOTE — ASSESSMENT & PLAN NOTE
-Uncontrolled on Amlodipine 10 mg once daily and lisinopril 10 mg once daily.    Patient is experiencing some right knee pain and scrotal discomfort.  Orders:    Lipid Panel; Future    Albumin/Creatinine Ratio, Urine; Future    Comprehensive Metabolic Panel; Future    amLODIPine (NORVASC) 10 MG tablet; Take 1 tablet by mouth daily

## 2025-04-23 NOTE — ASSESSMENT & PLAN NOTE
-Chronic, Uncontrolled.   Last A1c was 8.8 in August 2024.  This was decreased from 9.0 in May 2024.    -He is currently taking Humalog 10 units with meals and Lantus 30 units twice a day.    -On Trulicity 3 mg once weekly dose.  -Recheck A1c level.  Pending results may increase Trulicity to 4.5 mg once weekly dose    Orders:    Hemoglobin A1C; Future    Lipid Panel; Future    Albumin/Creatinine Ratio, Urine; Future    Comprehensive Metabolic Panel; Future    External Referral To Podiatry    External Referral To Endocrinology

## 2025-04-23 NOTE — ASSESSMENT & PLAN NOTE
-On coumadin  10 mg a day  Recheck PT/INR in 1 month.  Orders:    AMB POC PT/INR    Protime-INR; Future

## 2025-04-23 NOTE — ASSESSMENT & PLAN NOTE
-Patient on Trulicity 3 mg once weekly  -Has gained 3 pounds since his last office visit in January 2025.  Patient's ability to exercise is very limited.  Patient is  using a manual wheelchair for safety reasons.  -Currently receiving physical therapy on Monday's and Wednesday's ([facility] [place])  -Patient may be a good candidate for bariatric surgery, but need his diabetes better controlled  -Patient requesting to be seen by Endocrinology  -Recommend a low calorie diet  -Patient encouraged to exercise for 30 minutes 3 to 5 times a week.    -Recommend eating a well balanced healthy diet. (Consistent of lean meats, lots of fruits, vegetables and whole grains).    -Limit processed foods.   -Drink 32 to 64 ounces of fluid each day  -Eat 2 to 3 g of fiber each day     Orders:    External Referral To Endocrinology

## 2025-04-24 LAB
A/G RATIO: 0.8 RATIO (ref 1.1–2.6)
ALBUMIN: 3.5 G/DL (ref 3.5–5)
ALP BLD-CCNC: 143 U/L (ref 25–115)
ALT SERPL-CCNC: 24 U/L (ref 5–40)
ANION GAP SERPL CALCULATED.3IONS-SCNC: 14 MMOL/L (ref 3–15)
AST SERPL-CCNC: 27 U/L (ref 10–37)
BILIRUB SERPL-MCNC: 0.1 MG/DL (ref 0.2–1.2)
BUN BLDV-MCNC: 17 MG/DL (ref 6–22)
CALCIUM SERPL-MCNC: 9 MG/DL (ref 8.4–10.5)
CHLORIDE BLD-SCNC: 100 MMOL/L (ref 98–110)
CHOLESTEROL, TOTAL: 170 MG/DL (ref 110–200)
CHOLESTEROL/HDL RATIO: 4.1 (ref 0–5)
CO2: 21 MMOL/L (ref 20–32)
CREAT SERPL-MCNC: 2.1 MG/DL (ref 0.5–1.2)
ESTIMATED AVERAGE GLUCOSE: 295 MG/DL (ref 91–123)
GFR, ESTIMATED: 37.3 ML/MIN/1.73 SQ.M.
GLOBULIN: 4.6 G/DL (ref 2–4)
GLUCOSE: 285 MG/DL (ref 70–99)
HBA1C MFR BLD: 11.9 % (ref 4.8–5.6)
HDLC SERPL-MCNC: 41 MG/DL
HEPATITIS C ANTIBODY: NORMAL
HIV INTERPRETATION: NORMAL
HIV1/0/2 AB/AG: NON REACTIVE
LDL CHOLESTEROL: 80 MG/DL (ref 50–99)
LDL/HDL RATIO: 2
NON-HDL CHOLESTEROL: 129 MG/DL
POTASSIUM SERPL-SCNC: 4.6 MMOL/L (ref 3.5–5.5)
SODIUM BLD-SCNC: 135 MMOL/L (ref 133–145)
TOTAL PROTEIN: 8.1 G/DL (ref 6.4–8.3)
TRIGL SERPL-MCNC: 245 MG/DL (ref 40–149)
VLDLC SERPL CALC-MCNC: 49 MG/DL (ref 8–30)

## 2025-04-25 LAB — SENTARA SPECIMEN COLLECTION: NORMAL

## 2025-04-27 ENCOUNTER — RESULTS FOLLOW-UP (OUTPATIENT)
Facility: CLINIC | Age: 50
End: 2025-04-27

## 2025-04-27 DIAGNOSIS — E78.1 HYPERTRIGLYCERIDEMIA: Primary | ICD-10-CM

## 2025-04-27 RX ORDER — OMEGA-3-ACID ETHYL ESTERS 1 G/1
2 CAPSULE, LIQUID FILLED ORAL 2 TIMES DAILY
Qty: 120 CAPSULE | Refills: 5 | Status: SHIPPED | OUTPATIENT
Start: 2025-04-27

## 2025-04-28 ENCOUNTER — HOSPITAL ENCOUNTER (OUTPATIENT)
Facility: HOSPITAL | Age: 50
Setting detail: RECURRING SERIES
Discharge: HOME | End: 2025-05-01
Payer: MEDICAID

## 2025-04-28 PROCEDURE — 97530 THERAPEUTIC ACTIVITIES: CPT

## 2025-04-28 PROCEDURE — 97110 THERAPEUTIC EXERCISES: CPT

## 2025-04-28 PROCEDURE — 97116 GAIT TRAINING THERAPY: CPT

## 2025-04-28 PROCEDURE — 97112 NEUROMUSCULAR REEDUCATION: CPT

## 2025-04-28 NOTE — PROGRESS NOTES
PHYSICAL / OCCUPATIONAL THERAPY - DAILY TREATMENT NOTE    Patient Name: Temo Churchill    Date: 2025    : 1975  Insurance: Payor: [facility] MEDICAID / Plan: [facility] CARDINAL CARE / Product Type: *No Product type* /      Patient  verified Yes     Visit #   Current / Total 5 10   Time   In / Out 11:42 am 12:22 pm   Pain   In / Out 7/10 right knee 7/10 right knee   Subjective Functional Status/Changes: Patient reports he is able to walk for longer distances since beginning treatment in PT.     TREATMENT AREA =  Gait instability     OBJECTIVE  Therapeutic Procedures:  Tx Min Procedure, Rationale, Specifics   10 73394 Therapeutic Exercise (timed):  increase ROM, strength, coordination, balance, and proprioception to improve patient's ability to progress to PLOF and address remaining functional goals. (see flow sheet as applicable)     Details if applicable:  LE strengthening; reassessment     8 04768 Neuromuscular Re-Education (timed):  improve balance, coordination, kinesthetic sense, posture, core stability and proprioception to improve patient's ability to develop conscious control of individual muscles and awareness of position of extremities in order to progress to PLOF and address remaining functional goals. (see flow sheet as applicable)     Details if applicable:  quad and glute re-ed     10 44120 Therapeutic Activity (timed):  use of dynamic activities replicating functional movements to increase ROM, strength, coordination, balance, and proprioception in order to improve patient's ability to progress to PLOF and address remaining functional goals.  (see flow sheet as applicable)     Details if applicable:  standing, transfers     12 52431 Gait Training (timed):    4 x 70  feet with RW (assistive device) over level surfaces with SBA/CGA level of assist. Cuing for maintenance of upright posture.  To improve safety and dynamic movement with household/community ambulation.  (see flow 
Physical Therapy Discharge Instructions      [facility]   [address] [address] [phone] fax      Patient: Temo Churchill  : 1975      Continue Home Exercise Program 1 times per day, 3-4 times per week:      Continue with    [x] Ice  as needed               Follow up with MD:     [x] Upon completion of therapy           Recommendations:     [x]   Return to activity with home program          Additional Comments: Temo, thank you for your hard work during your therapy sessions and congratulations on your progress! It has been a pleasure working with you. Please reach out in the future if you have any questions.              Carlos Guevara, PTA   2025   12:10 PM    
lifting during household tasks.  Status at last assessment: 5 repetitions in 23 seconds from WC with RW  Current: goal met; 23 seconds  4.  Patient will increase LEFS Score to 17/80 to improve tolerance to standing and walking during household tasks.  Status at last assessment: 14/80  Current: goal progressing; 14/80        RECOMMENDATIONS  Discontinue therapy; patient has made appropriate progress towards goals at this time.    If you have any questions/comments please contact us directly at [phone].   Thank you for allowing us to assist in the care of your patient.  PTA signature  Carlos Guevara PTA     Therapist Signature: BHUMIKA Meneses Date: 4/28/25   Reporting Period: 12/9/24-4/28/25 Time: 6:23 PM     NOTE TO PHYSICIAN:    To ensure we are able to process the patient's encounter and avoid risk of your patient   receiving a bill for our services, please sign and return this discharge summary by 5/28/25.  Thank you!      ___ I have read the above report and request that my patient be discharged from therapy.     Physician Signature:        Date:       Time:

## 2025-05-06 NOTE — PROGRESS NOTES
PHYSICAL / OCCUPATIONAL THERAPY - DAILY TREATMENT NOTE    Patient Name: Temo Churchill    Date: 2025    : 1975  Insurance: Payor: [facility] MEDICAID / Plan: [facility] CARDINAL CARE / Product Type: *No Product type* /      Patient  verified Yes     Visit #   Current / Total 4 10   Time   In / Out 11:55 12:27   Pain   In / Out 7/10 right knee 7/10 right knee   Subjective Functional Status/Changes: \"The doctor sent a referral for my knee.\"     TREATMENT AREA =  Gait instability     OBJECTIVE  Therapeutic Procedures:  Tx Min Procedure, Rationale, Specifics   8 08057 Therapeutic Exercise (timed):  increase ROM, strength, coordination, balance, and proprioception to improve patient's ability to progress to PLOF and address remaining functional goals. (see flow sheet as applicable)     Details if applicable:  LE strengthening     8 97997 Neuromuscular Re-Education (timed):  improve balance, coordination, kinesthetic sense, posture, core stability and proprioception to improve patient's ability to develop conscious control of individual muscles and awareness of position of extremities in order to progress to PLOF and address remaining functional goals. (see flow sheet as applicable)     Details if applicable:  quad and glute re-ed, scapular re-ed     8 21008 Therapeutic Activity (timed):  use of dynamic activities replicating functional movements to increase ROM, strength, coordination, balance, and proprioception in order to improve patient's ability to progress to PLOF and address remaining functional goals.  (see flow sheet as applicable)     Details if applicable:  STS, step ups     8 07894 Gait Training (timed):    120 x 1 , 75 x 1  feet with RW (assistive device) over level surfaces with SBA level of assist. Cuing for maintenance of upright posture.  To improve safety and dynamic movement with household/community ambulation.  (see flow sheet as applicable)     Details if applicable:  gait 
Wheelchair/Stroller

## 2025-05-14 NOTE — PROGRESS NOTES
Patient: Temo Churchill                MRN: [id number]       SSN: [id number]  YOB: 1975        AGE: 49 y.o.        SEX: male      PCP: Catalina Beaulieu MD  05/16/25    Chief Complaint   Patient presents with    Knee Pain     Right knee       HISTORY:  Temo Churchill is a 49 y.o. male referred by Dr. Beauileu for 10 month history of worsening right knee pain. He underwent right foot I&D on 7/6/24 by  at [facility] and again on 7/10/24 by Dr. Palacios at [facility]. He has been experiencing increasing right knee pain since those surgeries. He denies any injury. He feels right knee pain with standing, walking and stair climbing.  He experiences startup pain after sitting. He has also started to experienced left knee pain due to overcompensating for his right knee problem. He denies any h/o knee injury.     Occupation, etc: Mr. Churchill retired 2 years ago. 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 He lives in [place] with his mother. He has 2 adult daughters, 1 son and 5 grandchildren. His daughter is expecting his 6th grandchild soon. He weighs 430 lbs and is 6'. He is an insulin controlled diabetic. He was recently prescribed a semaglutide injection. He has a h/o hypertension.   Hemoglobin A1C   Date Value Ref Range Status   04/23/2025 11.9 (H) 4.8 - 5.6 % Final     Wt Readings from Last 3 Encounters:   05/16/25 (!) 195 kg (430 lb)   04/23/25 (!) 210 kg (463 lb)   01/27/25 (!) 208.7 kg (460 lb)      Body mass index is 58.32 kg/m².    Patient Active Problem List   Diagnosis    Diabetes mellitus (HCC)    Essential hypertension    Pulmonary embolism and infarction (HCC)    Current use of long term anticoagulation    Hyperlipidemia    Chronic gout without tophus    Generalized weakness    Morbid obesity with BMI of 60.0-69.9, adult ([facility])       Social History     Tobacco Use    Smoking

## 2025-05-16 ENCOUNTER — OFFICE VISIT (OUTPATIENT)
Age: 50
End: 2025-05-16

## 2025-05-16 VITALS — BODY MASS INDEX: 42.66 KG/M2 | HEIGHT: 72 IN | WEIGHT: 315 LBS

## 2025-05-16 DIAGNOSIS — G89.29 CHRONIC PAIN OF RIGHT KNEE: Primary | ICD-10-CM

## 2025-05-16 DIAGNOSIS — M17.11 UNILATERAL PRIMARY OSTEOARTHRITIS, RIGHT KNEE: ICD-10-CM

## 2025-05-16 DIAGNOSIS — M25.561 CHRONIC PAIN OF RIGHT KNEE: Primary | ICD-10-CM

## 2025-05-16 RX ORDER — BETAMETHASONE SODIUM PHOSPHATE AND BETAMETHASONE ACETATE 3; 3 MG/ML; MG/ML
3 INJECTION, SUSPENSION INTRA-ARTICULAR; INTRALESIONAL; INTRAMUSCULAR; SOFT TISSUE ONCE
Status: COMPLETED | OUTPATIENT
Start: 2025-05-16 | End: 2025-05-16

## 2025-05-16 RX ORDER — BUPIVACAINE HYDROCHLORIDE 5 MG/ML
4 INJECTION, SOLUTION PERINEURAL ONCE
Status: COMPLETED | OUTPATIENT
Start: 2025-05-16 | End: 2025-05-16

## 2025-05-16 RX ADMIN — BUPIVACAINE HYDROCHLORIDE 20 MG: 5 INJECTION, SOLUTION PERINEURAL at 14:01

## 2025-05-16 RX ADMIN — BETAMETHASONE SODIUM PHOSPHATE AND BETAMETHASONE ACETATE 3 MG: 3; 3 INJECTION, SUSPENSION INTRA-ARTICULAR; INTRALESIONAL; INTRAMUSCULAR; SOFT TISSUE at 14:02

## 2025-07-18 DIAGNOSIS — M1A.9XX0 CHRONIC GOUT WITHOUT TOPHUS, UNSPECIFIED CAUSE, UNSPECIFIED SITE: ICD-10-CM

## 2025-07-19 RX ORDER — ALLOPURINOL 100 MG/1
100 TABLET ORAL DAILY
Qty: 90 TABLET | Refills: 1 | Status: SHIPPED | OUTPATIENT
Start: 2025-07-19

## 2025-08-19 DIAGNOSIS — E11.69 TYPE 2 DIABETES MELLITUS WITH OTHER SPECIFIED COMPLICATION, WITHOUT LONG-TERM CURRENT USE OF INSULIN (HCC): ICD-10-CM

## 2025-08-23 RX ORDER — INSULIN GLARGINE 100 [IU]/ML
INJECTION, SOLUTION SUBCUTANEOUS
Qty: 5 ADJUSTABLE DOSE PRE-FILLED PEN SYRINGE | Refills: 2 | Status: SHIPPED | OUTPATIENT
Start: 2025-08-23